# Patient Record
Sex: FEMALE | Race: WHITE
[De-identification: names, ages, dates, MRNs, and addresses within clinical notes are randomized per-mention and may not be internally consistent; named-entity substitution may affect disease eponyms.]

---

## 2018-02-01 ENCOUNTER — HOSPITAL ENCOUNTER (EMERGENCY)
Dept: HOSPITAL 33 - ED | Age: 53
Discharge: HOME | End: 2018-02-01
Payer: COMMERCIAL

## 2018-02-01 VITALS — SYSTOLIC BLOOD PRESSURE: 131 MMHG | DIASTOLIC BLOOD PRESSURE: 77 MMHG | HEART RATE: 88 BPM | OXYGEN SATURATION: 97 %

## 2018-02-01 DIAGNOSIS — G43.001: Primary | ICD-10-CM

## 2018-02-01 LAB
BASE EXCESS BLDV CALC-SCNC: 1.5 MMOL/L (ref -2–2)
COHGB MFR BLDV: 1.9 % T HGB (ref 0–6.9)
HCO3 BLDV-SCNC: 27.7 MEQ/L (ref 22–28)
HGB BLDV-MCNC: 13.4 G/DL
INHALED O2 CONCENTRATION: 21 %
PCO2 BLDV: 49 MM/HG (ref 42–55)
PO2 BLDV: 29 MM/HG (ref 25–40)
POTASSIUM BLDV-SCNC: 4 MMOL/L (ref 3.5–5.1)
SAO2 % BLDV: 63.9 % (ref 95–100)

## 2018-02-01 PROCEDURE — 82805 BLOOD GASES W/O2 SATURATION: CPT

## 2018-02-01 PROCEDURE — 96360 HYDRATION IV INFUSION INIT: CPT

## 2018-02-01 PROCEDURE — 99284 EMERGENCY DEPT VISIT MOD MDM: CPT

## 2018-02-01 PROCEDURE — 36000 PLACE NEEDLE IN VEIN: CPT

## 2018-02-01 PROCEDURE — 96375 TX/PRO/DX INJ NEW DRUG ADDON: CPT

## 2018-02-01 PROCEDURE — 96374 THER/PROPH/DIAG INJ IV PUSH: CPT

## 2018-02-01 NOTE — ERPHSYRPT
- History of Present Illness


Time Seen by Provider: 02/01/18 09:09


Source: patient


Patient Subjective Stated Complaint: PT STATES SHE HAS HAD A HEADACHE FOR THE 

PAST 1 MONTH. STATES HER PHARMACY HAS BEEN OUT OF THE IMITREX INJECTIONS SHE 

TAKES. STATES HEADACHE BECAME WORSE YESTERDAY.


Triage Nursing Assessment: PT PINK, WARM, DRY. PUPILS PERRL. PT AMBULATED INTO 

ER WITHOUT DIFFICULTY.


Physician History: 





CC: headache


Hx: 51 y/o patient of Dr Feliciano with long hx of migraine headaches. She has hx 

of headache since last month, gradually worse. Pharmacy out of her imitrex so 

she could not take it. No fever or chills. She has photophobia. No focal 

weakness.


Timing/Duration: day(s)


Quality: aching


Severity of Pain-Max: severe


Severity of Pain-Current: severe


Allergies/Adverse Reactions: 








Sulfa (Sulfonamide Antibiotics) [Sulfa(Sulfonamide Antibiotics)] Allergy (Mild, 

Verified 02/01/18 09:05)


 Hives





Home Medications: 








Clonazepam [Klonopin] 2 mg PO .UNKNOWN 07/09/12 [History]


Pravastatin Sodium 20 mg PO .UNKNOWN 11/26/15 [History]


Buprenorphine HCl [Belbuca] 600 mcg BC BID 02/01/18 [History]





Hx Tetanus, Diphtheria Vaccination/Date Given: Yes (UP TO DATE)


Hx Influenza Vaccination/Date Given: No


Hx Pneumococcal Vaccination/Date Given: No


Immunizations Up to Date: Yes





- Review of Systems


Constitutional: No Fever, No Chills


Eyes: Photophobia


Ears, Nose, & Throat: No Symptoms


Respiratory: No Cough, No Dyspnea


Cardiac: No Chest Pain


Abdominal/Gastrointestinal: Nausea, No Abdominal Pain, No Vomiting, No Diarrhea


Genitourinary Symptoms: No Dysuria


Skin: No Rash


Neurological: Headache, No Dizziness, No Focal Weakness, No Parasthesia


All Other Systems: Reviewed and Negative





- Past Medical History


Pertinent Past Medical History: Yes


Neurological History: No Pertinent History


ENT History: No Pertinent History


Cardiac History: Coronary Artery Disease, Myocardial Infarction (MI)


Respiratory History: Bronchitis, Sleep Apnea


Endocrine Medical History: No Pertinent History


Musculoskeletal History: Arthritis


GI Medical History: No Pertinent History


 History: No Pertinent History


Psycho-Social History: Anxiety, Depression


Female Reproductive Disorders: No Pertinent History


Other Medical History: HTN.  Migraine Headaches.  Coronary Stent





- Past Surgical History


Past Surgical History: Yes


Neuro Surgical History: No Pertinent History


Cardiac: Cardiac Catheterization, Cardiac Stent


Respiratory: No Pertinent History


Gastrointestinal: No Pertinent History


Genitourinary: No Pertinent History


Musculoskeletal: No Pertinent History


Female Surgical History: Tubal Ligation


Other Surgical History: STENT X1, tonsilectomy





- Social History


Smoking Status: Never smoker


Exposure to second hand smoke: No


Drug Use: none


Patient Lives Alone: No


Significant Family History: heart disease





- Female History


Hx Pregnant Now: No





- Nursing Vital Signs


Nursing Vital Signs: 


 Initial Vital Signs











Temperature  97.8 F   02/01/18 08:59


 


Pulse Rate  83   02/01/18 08:59


 


Respiratory Rate  20   02/01/18 08:59


 


Blood Pressure  161/89   02/01/18 08:59


 


O2 Sat by Pulse Oximetry  96   02/01/18 08:59








 Pain Scale











Pain Intensity                 10

















- Physical Exam


General Appearance: alert


Eye Exam: PERRL/EOMI, photophobia


Ears, Nose, Throat Exam: normal ENT inspection, moist mucous membranes


Neck Exam: normal inspection, non-tender, supple, No meningismus


Respiratory Exam: normal breath sounds


Cardiovascular Exam: regular rate/rhythm


Gastrointestinal/Abdominal Exam: soft, No tenderness, No distention


Extremity Exam: normal inspection, normal range of motion


Mental Status Exam: alert, oriented x 3, cooperative


CNs Exam: normal hearing, normal speech, PERRL


Motor/Sensory Exam: no motor deficit, no sensory deficit


Skin Exam: warm, dry, No rash


**SpO2 Interpretation**: normal


SpO2: 96


Oxygen Delivery: Room Air





- Course


Nursing assessment & vital signs reviewed: Yes


Ordered Tests: 


 Active Orders 24 hr











 Category Date Time Status


 


 IV Insertion STAT Care  02/01/18 09:16 Active


 


 VENOUS BLOOD GAS Urgent Lab  02/01/18 09:30 Completed








Medication Summary











Generic Name Dose Route Start Last Admin





  Trade Name Freq  PRN Reason Stop Dose Admin


 


Sodium Chloride  1,000 mls @ 999 mls/hr  02/01/18 09:16  02/01/18 09:30





  Sodium Chloride 0.9% 1000 Ml  IV  02/01/18 10:16  999 mls/hr





  .Q1H1M STA   Administration














Discontinued Medications














Generic Name Dose Route Start Last Admin





  Trade Name Freq  PRN Reason Stop Dose Admin


 


Diphenhydramine HCl  25 mg  02/01/18 09:16  02/01/18 09:30





  Benadryl 50 Mg/Ml***  IV  02/01/18 09:17  25 mg





  STAT ONE   Administration


 


Diphenhydramine HCl  Confirm  02/01/18 09:23  





  Benadryl 50 Mg/Ml***  Administered  02/01/18 09:24  





  Dose   





  50 mg   





  .ROUTE   





  .STK-MED ONE   


 


Sodium Chloride  Confirm  02/01/18 09:23  





  Sodium Chloride 0.9% 1000 Ml  Administered  02/01/18 09:24  





  Dose   





  1,000 mls @ ud   





  .ROUTE   





  .STK-MED ONE   


 


Ketorolac Tromethamine  30 mg  02/01/18 09:16  02/01/18 09:30





  Toradol 30 Mg Injection***  IV  02/01/18 09:17  30 mg





  STAT ONE   Administration


 


Ketorolac Tromethamine  Confirm  02/01/18 09:22  





  Toradol 30 Mg Injection***  Administered  02/01/18 09:23  





  Dose   





  30 mg   





  .ROUTE   





  .STK-MED ONE   


 


Metoclopramide HCl  10 mg  02/01/18 09:16  02/01/18 09:30





  Reglan 10 Mg/2 Ml***  IV  02/01/18 09:17  10 mg





  STAT ONE   Administration


 


Metoclopramide HCl  Confirm  02/01/18 09:23  





  Reglan 10 Mg/2 Ml***  Administered  02/01/18 09:24  





  Dose   





  10 mg   





  .ROUTE   





  .STK-MED ONE   











Lab/Rad Data: 


 Laboratory Results











  02/01/18 Range/Units





  09:30 


 


VBG pH  7.36  (7.32-7.42)  


 


VBG pCO2 at Pat Temp  49  (42-55)  mm/Hg


 


VBG pO2 at Pat Temp  29  (25-40)  mm/Hg


 


VBG HCO3  27.7  (22-28)  meq/L


 


VBG O2 Sat (Nae)  63.9 L  ()  


 


VBG Base Excess  1.5  (-2.0-2.0)  


 


VBG Hemoglobin  13.4  


 


VBG Carboxyhemoglobin  1.9  (0.0-6.9)  % T HGB


 


POC Potassium  4.0  (3.5-5.1)  














- Progress


Progress Note: 





02/01/18 10:08


CO normal. She is feeling better after benadryl/toradol/reglan and IVF. Will 

release with headache instructions.





Counseled pt/family regarding: lab results, diagnosis, need for follow-up





- Departure


Time of Disposition: 10:09


Departure Disposition: Home


Clinical Impression: 


Migraine headache


Qualifiers:


 Migraine type: without aura Status migrainosus presence: with status 

migrainosus Intractability: not intractable Qualified Code(s): G43.001 - 

Migraine without aura, not intractable, with status migrainosus





Condition: Stable


Critical Care Time: No


Referrals: 


NU FELICIANO [Primary Care Provider] - 


Instructions:  Headache, Adult (DC)


Additional Instructions: 


HEADACHE





1.  After discharge from the emergency department, you should rest at home in a 

cool, dark, quiet place for 12-24 hours.


2.  If any of the following signs or symptoms are noticed, you should be re-

evaluated right away:


   A.  Visual changes


   B.  Stiff Neck


   C.  Change in quality or location of pain


   D.  Fever


   E.  Recurrent vomiting


3.  If pain medications were prescribed or given, they may cause drowsiness.





No driving today and stay with family.


Follow up with Dr Feliciano regarding your normal medications.

## 2018-03-11 ENCOUNTER — HOSPITAL ENCOUNTER (EMERGENCY)
Dept: HOSPITAL 33 - ED | Age: 53
Discharge: ANOTHER HOSPITAL | End: 2018-03-11
Payer: COMMERCIAL

## 2018-03-11 VITALS — OXYGEN SATURATION: 98 %

## 2018-03-11 DIAGNOSIS — R42: Primary | ICD-10-CM

## 2018-03-11 DIAGNOSIS — R11.2: ICD-10-CM

## 2018-03-11 DIAGNOSIS — M19.90: ICD-10-CM

## 2018-03-11 DIAGNOSIS — I72.6: ICD-10-CM

## 2018-03-11 DIAGNOSIS — F41.8: ICD-10-CM

## 2018-03-11 LAB
ALBUMIN SERPL-MCNC: 4.6 G/DL (ref 3.5–5)
ALP SERPL-CCNC: 146 U/L (ref 38–126)
ALT SERPL-CCNC: 49 U/L (ref 0–35)
AMYLASE SERPL-CCNC: 78 U/L (ref 30–110)
ANION GAP SERPL CALC-SCNC: 18 MEQ/L (ref 5–15)
AST SERPL QL: 39 U/L (ref 14–36)
BASOPHILS # BLD AUTO: 0.03 10*3/UL (ref 0–0.4)
BASOPHILS NFR BLD AUTO: 0.3 % (ref 0–0.4)
BILIRUB BLD-MCNC: 0.4 MG/D? (ref 0.2–1.3)
BUN SERPL-MCNC: 14 MG/DL (ref 7–17)
CALCIUM SPEC-MCNC: 9.3 MG/DL (ref 8.4–10.2)
CHLORIDE SERPL-SCNC: 101 MEQ/L (ref 98–107)
CO2 SERPL-SCNC: 28 MMOL/L (ref 22–30)
CREAT SERPL-MCNC: 0.66 MG/DL (ref 0.52–1.04)
EOSINOPHIL # BLD AUTO: 0.04 10*3/UL (ref 0–0.5)
GLUCOSE SERPL-MCNC: 121 MG/DL (ref 74–106)
GLUCOSE UR-MCNC: NEGATIVE MG/DL
GRANULOCYTES # BLD AUTO: 9.65 10*3/UL (ref 1.4–6.9)
HCT VFR BLD AUTO: 39.5 % (ref 35–47)
HGB BLD-MCNC: 12.4 GM/DL (ref 12–16)
LIPASE SERPL-CCNC: 87 U/L (ref 23–300)
LYMPHOCYTES # SPEC AUTO: 1.55 10*3/UL (ref 1–4.6)
MCH RBC QN AUTO: 28.6 PG (ref 26–32)
MCHC RBC AUTO-ENTMCNC: 31.4 G/DL (ref 32–36)
MONOCYTES # BLD AUTO: 0.67 10*3/UL (ref 0–1.3)
NEUTROPHILS NFR BLD AUTO: 80.8 % (ref 36–66)
PLATELET # BLD AUTO: 387 K/MM3 (ref 150–450)
POTASSIUM SERPLBLD-SCNC: 4.1 MMOL/L (ref 3.5–5.1)
PROT SERPL-MCNC: 8.4 MG/DL (ref 6.3–8.2)
PROT UR STRIP-MCNC: (no result) MG/DL
RBC # BLD AUTO: 4.33 M/MM3 (ref 4.1–5.4)
SODIUM SERPL-SCNC: 143 MMOL/L (ref 137–145)
WBC # BLD AUTO: 11.9 K/MM3 (ref 4–10.5)
WBC URNS QL MICRO: (no result) /HPF (ref 0–5)

## 2018-03-11 PROCEDURE — 96361 HYDRATE IV INFUSION ADD-ON: CPT

## 2018-03-11 PROCEDURE — 36000 PLACE NEEDLE IN VEIN: CPT

## 2018-03-11 PROCEDURE — 84484 ASSAY OF TROPONIN QUANT: CPT

## 2018-03-11 PROCEDURE — 81000 URINALYSIS NONAUTO W/SCOPE: CPT

## 2018-03-11 PROCEDURE — 85025 COMPLETE CBC W/AUTO DIFF WBC: CPT

## 2018-03-11 PROCEDURE — 87086 URINE CULTURE/COLONY COUNT: CPT

## 2018-03-11 PROCEDURE — 36415 COLL VENOUS BLD VENIPUNCTURE: CPT

## 2018-03-11 PROCEDURE — 82150 ASSAY OF AMYLASE: CPT

## 2018-03-11 PROCEDURE — 96360 HYDRATION IV INFUSION INIT: CPT

## 2018-03-11 PROCEDURE — 99285 EMERGENCY DEPT VISIT HI MDM: CPT

## 2018-03-11 PROCEDURE — 70450 CT HEAD/BRAIN W/O DYE: CPT

## 2018-03-11 PROCEDURE — 83690 ASSAY OF LIPASE: CPT

## 2018-03-11 PROCEDURE — 96374 THER/PROPH/DIAG INJ IV PUSH: CPT

## 2018-03-11 PROCEDURE — 71045 X-RAY EXAM CHEST 1 VIEW: CPT

## 2018-03-11 PROCEDURE — 83735 ASSAY OF MAGNESIUM: CPT

## 2018-03-11 PROCEDURE — 80053 COMPREHEN METABOLIC PANEL: CPT

## 2018-03-11 PROCEDURE — 99284 EMERGENCY DEPT VISIT MOD MDM: CPT

## 2018-03-11 PROCEDURE — 93005 ELECTROCARDIOGRAM TRACING: CPT

## 2018-03-11 NOTE — ERPHSYRPT
- History of Present Illness


Time Seen by Provider: 03/11/18 21:00


Source: patient


Exam Limitations: no limitations


Patient Subjective Stated Complaint: dizzy and vomiting since 1700


Triage Nursing Assessment: alert and nauseated.  staes dizzy when opens eyes.  

started at 1700 today.  denies fever.. no urinary symptoms.


Physician History: 





FOR THE PAST 4 HOURS PT HAS HAD DIZZINESS(VERTIGO AND DISEQUILIBRIUM) AND 

VOMITING X5 WITHOUT BLOOD. PT DENIES CHEST PAIN, HEADACHE, SHORTNESS OF AIR, 

FEVER, WEAKNESS, NUMBNESS, ABDOMINAL PAIN.


Allergies/Adverse Reactions: 








Sulfa (Sulfonamide Antibiotics) [Sulfa(Sulfonamide Antibiotics)] Allergy (Mild, 

Verified 02/01/18 09:05)


 Hives





Home Medications: 








Clonazepam [Klonopin] 2 mg PO .UNKNOWN 07/09/12 [History]


Pravastatin Sodium 20 mg PO .UNKNOWN 11/26/15 [History]


Buprenorphine HCl [Belbuca] 600 mcg BC BID 02/01/18 [History]





Hx Tetanus, Diphtheria Vaccination/Date Given: Yes (UP TO DATE)


Hx Influenza Vaccination/Date Given: No


Hx Pneumococcal Vaccination/Date Given: No





- Review of Systems


Constitutional: No Fever


Respiratory: No Dyspnea


Cardiac: No Chest Pain


Abdominal/Gastrointestinal: Vomiting, No Abdominal Pain, No Diarrhea


Neurological: Dizziness, No Headache


All Other Systems: Reviewed and Negative





- Past Medical History


Pertinent Past Medical History: Yes


Neurological History: No Pertinent History


ENT History: No Pertinent History


Cardiac History: Coronary Artery Disease, Myocardial Infarction (MI)


Respiratory History: Bronchitis, Sleep Apnea


Endocrine Medical History: No Pertinent History


Musculoskeletal History: Arthritis


GI Medical History: No Pertinent History


 History: No Pertinent History


Psycho-Social History: Anxiety, Depression


Female Reproductive Disorders: No Pertinent History


Other Medical History: HTN.  Migraine Headaches.  Coronary Stent





- Past Surgical History


Past Surgical History: Yes


Neuro Surgical History: No Pertinent History


Cardiac: Cardiac Catheterization, Cardiac Stent


Respiratory: No Pertinent History


Gastrointestinal: No Pertinent History


Genitourinary: No Pertinent History


Musculoskeletal: No Pertinent History


Female Surgical History: Tubal Ligation


Other Surgical History: STENT X1, tonsilectomy





- Social History


Smoking Status: Never smoker


Exposure to second hand smoke: No


Drug Use: none


Patient Lives Alone: No


Significant Family History: heart disease





- Female History


Hx Pregnant Now: No





- Nursing Vital Signs


Nursing Vital Signs: 


 Initial Vital Signs











Temperature  97.8 F   03/11/18 21:02


 


Pulse Rate  80   03/11/18 21:02


 


Respiratory Rate  20   03/11/18 21:02


 


Blood Pressure  164/94   03/11/18 21:02


 


O2 Sat by Pulse Oximetry  98   03/11/18 21:02








 Pain Scale











Pain Intensity                 4

















- Physical Exam


General Appearance: alert


Eye Exam: PERRL/EOMI


Ears, Nose, Throat Exam: pharynx normal, moist mucous membranes


Neck Exam: normal inspection


Respiratory Exam: normal breath sounds


Cardiovascular Exam: normal heart sounds


Gastrointestinal/Abdomen Exam: soft, normal bowel sounds


Back Exam: normal range of motion


Extremity Exam: normal inspection, No pedal edema


Neurologic Exam: alert, cooperative, sensation nml, motor deficits (ROM OF LEFT 

UPPER AND BOTH LOWER EXTREMITIES FULL; RIGHT SHOULDER HAS LIMITED ABDUCTION(

ONGOING).)


Skin Exam: warm, dry


**SpO2 Interpretation**: normal


SpO2: 98


Oxygen Delivery: Room Air





- Course


Nursing assessment & vital signs reviewed: Yes





- CT Exams


  ** Head


CT Interpretation: Tele-radiologist Report (DILATED AND TORTUOUS LEFT VERTEBRAL 

ARTERY. MRI EVALUATION RECOMMENDED TO DETERMINE IF THIS IS AN ISOLATED VASCULAR 

DILATATION OR ASSOCIATED WITH A VASCULAR MASS.)


Ordered Tests: 


 Active Orders 24 hr











 Category Date Time Status


 


 Clean Catch Urine Specimen STAT Care  03/11/18 21:14 Active


 


 EKG-ER Only STAT Care  03/11/18 21:14 Active


 


 IV Insertion STAT Care  03/11/18 21:14 Active


 


 CHEST 1 VIEW (PORTABLE) Stat Exams  03/11/18 21:14 Taken


 


 HEAD WITHOUT CONTRAST [CT] Stat Exams  03/11/18 21:13 Taken


 


 AMYLASE Stat Lab  03/11/18 21:52 Completed


 


 CBC W DIFF Stat Lab  03/11/18 21:52 Completed


 


 CMP Stat Lab  03/11/18 21:52 Completed


 


 CULTURE,URINE Stat Lab  03/11/18 23:06 Received


 


 LIPASE Stat Lab  03/11/18 21:52 Completed


 


 MAGNESIUM Stat Lab  03/11/18 21:52 Completed


 


 TROPONIN Q3H Lab  03/11/18 21:52 Completed


 


 TROPONIN Q3H Lab  03/12/18 00:15 Ordered


 


 TROPONIN Q3H Lab  03/12/18 03:15 Ordered


 


 TROPONIN Q3H Lab  03/12/18 06:15 Ordered


 


 TROPONIN Q3H Lab  03/12/18 09:15 Ordered


 


 UA W/ MICROSCOPIC Stat Lab  03/11/18 23:06 Completed








Medication Summary











Generic Name Dose Route Start Last Admin





  Trade Name Freq  PRN Reason Stop Dose Admin


 


Sodium Chloride  1,000 mls @ 100 mls/hr  03/11/18 21:15  03/11/18 21:52





  Sodium Chloride 0.9% 1000 Ml  IV  04/10/18 21:14  100 mls/hr





  .Q10H KARLI   Administration














Discontinued Medications














Generic Name Dose Route Start Last Admin





  Trade Name Freq  PRN Reason Stop Dose Admin


 


Promethazine HCl  12.5 mg  03/11/18 21:14  03/11/18 21:52





  Phenergan 25 Mg Inj***  IV  03/11/18 21:15  12.5 mg





  STAT ONE   Administration


 


Promethazine HCl  Confirm  03/11/18 21:47  





  Phenergan 25 Mg Inj***  Administered  03/11/18 21:48  





  Dose   





  25 mg   





  .ROUTE   





  .[facility]-MED ONE   











Lab/Rad Data: 


 Laboratory Result Diagrams





 03/11/18 21:52 





 03/11/18 21:52 





 Laboratory Results











  03/11/18 03/11/18 03/11/18 Range/Units





  23:06 21:52 21:52 


 


WBC     (4.0-10.5)  K/mm3


 


RBC     (4.1-5.4)  M/mm3


 


Hgb     (12.0-16.0)  gm/dl


 


Hct     (35-47)  %


 


MCV     ()  fl


 


MCH     (26-32)  pg


 


MCHC     (32-36)  g/dl


 


RDW     (11.5-14.0)  %


 


Plt Count     (150-450)  K/mm3


 


MPV     (6-9.5)  fl


 


Gran %     (36.0-66.0)  %


 


Lymphocytes %     (24.0-44.0)  %


 


Monocytes %     (0.0-12.0)  %


 


Eosinophils %     (0.00-5.0)  %


 


Basophils %     (0.0-0.4)  %


 


Basophils #     (0-0.4)  


 


Sodium    143  (137-145)  mmol/L


 


Potassium    4.1  (3.5-5.1)  mmol/L


 


Chloride    101  ()  mEq/L


 


Carbon Dioxide    28  (22-30)  mmol/L


 


Anion Gap    18.0 H  (5-15)  MEQ/L


 


BUN    14  (7-17)  mg/dl


 


Creatinine    0.66  (0.52-1.04)  mg/dl


 


Estimated GFR    > 60  ML/MIN


 


Glucose    121 H  ()  mg/dL


 


Calcium    9.3  (8.4-10.2)  mg/dL


 


Magnesium    2.0  (1.6-2.3)  mg/dL


 


Total Bilirubin    0.40  (0.2-1.3)  mg/d?


 


AST    39 H  (14-36)  U/L


 


ALT    49 H  (0-35)  U/L


 


Alkaline Phosphatase    146 H  ()  U/L


 


Troponin I   < 0.012   (0.000-0.034)  ng/ml


 


Serum Total Protein    8.4 H  (6.3-8.2)  mg/dl


 


Albumin    4.6  (3.5-5.0)  g/dl


 


Amylase    78  ()  U/L


 


Lipase    87  ()  U/L


 


Ur Collection Type  VOID    


 


Urine Color  YELLOW    (YELLOW)  


 


Urine Appearance  CLEAR    (CLEAR)  


 


Urine pH  6.0    (5-6)  


 


Ur Specific Gravity  1.020    (1.005-1.025)  


 


Urine Protein  TRACE    (Negative)  


 


Urine Ketones  NEGATIVE    (NEGATIVE)  


 


Urine Blood  50    (0-5)  Darwin/ul


 


Urine Nitrite  NEGATIVE    (NEGATIVE)  


 


Urine Bilirubin  NEGATIVE    (NEGATIVE)  


 


Urine Urobilinogen  NORMAL    (0-1)  mg/dL


 


Ur Leukocyte Esterase  TRACE    (NEGATIVE)  


 


Urine Microscopic RBC  5-10    (0-2)  /HPF


 


Urine Microscopic WBC  0-2    (0-5)  /HPF


 


Ur Epithelial Cells  FEW    (FEW)  /HPF


 


Urine Bacteria  MODERATE    (NEGATIVE)  /HPF


 


Urine Mucus  MANY    (NEGATIVE)  /HPF


 


Urine Culture Reflexed  YES    (NO)  


 


Urine Glucose  NEGATIVE    (NEGATIVE)  mg/dL


 


Specimen Received  3/11/18 2155 03/11/18 Range/Units





  21:52 


 


WBC  11.9 H  (4.0-10.5)  K/mm3


 


RBC  4.33  (4.1-5.4)  M/mm3


 


Hgb  12.4  (12.0-16.0)  gm/dl


 


Hct  39.5  (35-47)  %


 


MCV  91.2  ()  fl


 


MCH  28.6  (26-32)  pg


 


MCHC  31.4 L  (32-36)  g/dl


 


RDW  13.4  (11.5-14.0)  %


 


Plt Count  387  (150-450)  K/mm3


 


MPV  9.9 H  (6-9.5)  fl


 


Gran %  80.8 H  (36.0-66.0)  %


 


Lymphocytes %  13.0 L  (24.0-44.0)  %


 


Monocytes %  5.6  (0.0-12.0)  %


 


Eosinophils %  0.3  (0.00-5.0)  %


 


Basophils %  0.3  (0.0-0.4)  %


 


Basophils #  0.03  (0-0.4)  


 


Sodium   (137-145)  mmol/L


 


Potassium   (3.5-5.1)  mmol/L


 


Chloride   ()  mEq/L


 


Carbon Dioxide   (22-30)  mmol/L


 


Anion Gap   (5-15)  MEQ/L


 


BUN   (7-17)  mg/dl


 


Creatinine   (0.52-1.04)  mg/dl


 


Estimated GFR   ML/MIN


 


Glucose   ()  mg/dL


 


Calcium   (8.4-10.2)  mg/dL


 


Magnesium   (1.6-2.3)  mg/dL


 


Total Bilirubin   (0.2-1.3)  mg/d?


 


AST   (14-36)  U/L


 


ALT   (0-35)  U/L


 


Alkaline Phosphatase   ()  U/L


 


Troponin I   (0.000-0.034)  ng/ml


 


Serum Total Protein   (6.3-8.2)  mg/dl


 


Albumin   (3.5-5.0)  g/dl


 


Amylase   ()  U/L


 


Lipase   ()  U/L


 


Ur Collection Type   


 


Urine Color   (YELLOW)  


 


Urine Appearance   (CLEAR)  


 


Urine pH   (5-6)  


 


Ur Specific Gravity   (1.005-1.025)  


 


Urine Protein   (Negative)  


 


Urine Ketones   (NEGATIVE)  


 


Urine Blood   (0-5)  Darwin/ul


 


Urine Nitrite   (NEGATIVE)  


 


Urine Bilirubin   (NEGATIVE)  


 


Urine Urobilinogen   (0-1)  mg/dL


 


Ur Leukocyte Esterase   (NEGATIVE)  


 


Urine Microscopic RBC   (0-2)  /HPF


 


Urine Microscopic WBC   (0-5)  /HPF


 


Ur Epithelial Cells   (FEW)  /HPF


 


Urine Bacteria   (NEGATIVE)  /HPF


 


Urine Mucus   (NEGATIVE)  /HPF


 


Urine Culture Reflexed   (NO)  


 


Urine Glucose   (NEGATIVE)  mg/dL


 


Specimen Received   














- Progress


Discussed with : Other (SPOKE WITH DR VAUGHAN([id number]) WHO ACCEPTED PT FOR 

TRANSFER TO [facility]. )





- Departure


Time of Disposition: 00:43


Departure Disposition: Transfer ([facility])


Clinical Impression: 


 DIZZINESS, VOMITING, DILATED AND TORTUOUS LEFT VERTEBRAL ARTERY, ARTHRITIS, 

ANXIETY, DEPRESSION





Condition: Stable


Critical Care Time: No


Referrals: 


DOCTOR,NO FAMILY [Primary Care Provider] -

## 2018-03-12 VITALS — SYSTOLIC BLOOD PRESSURE: 140 MMHG | HEART RATE: 77 BPM | DIASTOLIC BLOOD PRESSURE: 74 MMHG

## 2018-03-12 NOTE — XRAY
Indication: Dizziness.



Comparison: November 26, 2015.



Portable chest remains clear.  Heart and mediastinal structures within normal

limits.  Bony thorax intact.



Impression: Stable nonacute chest.

## 2018-03-12 NOTE — XRAY
Indication: Dizziness.  History of chronic migraines.



Multiple contiguous axial images obtained through the head without contrast.



Comparison: August 17, 2015.



Again normal appearing brain parenchyma, ventricles, and bony calvarium.

Stable mild dolichoectatic left vertebral artery.  Visualized paranasal

sinuses and mastoid air cells are clear.



Impression: No acute intracranial abnormalities.  Stable dolichoectatic left

vertebral artery.



Comment: Preliminary interpretation was made by [facility].  No critical discrepancy.



CT DI 67.22

## 2018-04-27 ENCOUNTER — HOSPITAL ENCOUNTER (OUTPATIENT)
Dept: HOSPITAL 33 - ED | Age: 53
Setting detail: OBSERVATION
LOS: 1 days | Discharge: HOME | End: 2018-04-28
Attending: FAMILY MEDICINE | Admitting: FAMILY MEDICINE
Payer: COMMERCIAL

## 2018-04-27 DIAGNOSIS — R07.9: Primary | ICD-10-CM

## 2018-04-27 DIAGNOSIS — I25.10: ICD-10-CM

## 2018-04-27 DIAGNOSIS — I25.2: ICD-10-CM

## 2018-04-27 DIAGNOSIS — I10: ICD-10-CM

## 2018-04-27 DIAGNOSIS — M19.90: ICD-10-CM

## 2018-04-27 DIAGNOSIS — F41.8: ICD-10-CM

## 2018-04-27 DIAGNOSIS — G47.30: ICD-10-CM

## 2018-04-27 LAB
ALBUMIN SERPL-MCNC: 4.6 G/DL (ref 3.5–5)
ALP SERPL-CCNC: 183 U/L (ref 38–126)
ALT SERPL-CCNC: 85 U/L (ref 0–35)
ANION GAP SERPL CALC-SCNC: 19.1 MEQ/L (ref 5–15)
APTT PPP: 33.8 SECONDS (ref 25.3–37)
AST SERPL QL: 61 U/L (ref 14–36)
BILIRUB BLD-MCNC: 0.4 MG/DL (ref 0.2–1.3)
BUN SERPL-MCNC: 9 MG/DL (ref 7–17)
CALCIUM SPEC-MCNC: 9.7 MG/DL (ref 8.4–10.2)
CHLORIDE SERPL-SCNC: 101 MMOL/L (ref 98–107)
CO2 SERPL-SCNC: 26 MMOL/L (ref 22–30)
CREAT SERPL-MCNC: 0.67 MG/DL (ref 0.52–1.04)
D DIMER BLD IA.RAPID-MCNC: 444.74 NG/ML (ref 215–500)
GLUCOSE SERPL-MCNC: 92 MG/DL (ref 74–106)
HCT VFR BLD AUTO: 39.8 % (ref 35–47)
HGB BLD-MCNC: 12.9 GM/DL (ref 12–16)
INR PPP: 1.02 (ref 0.8–3)
MCH RBC QN AUTO: 29.5 PG (ref 26–32)
MCHC RBC AUTO-ENTMCNC: 32.4 G/DL (ref 32–36)
PLATELET # BLD AUTO: 396 K/MM3 (ref 150–450)
POTASSIUM SERPLBLD-SCNC: 3.9 MMOL/L (ref 3.5–5.1)
PROT SERPL-MCNC: 8.8 G/DL (ref 6.3–8.2)
RBC # BLD AUTO: 4.38 M/MM3 (ref 4.1–5.4)
SODIUM SERPL-SCNC: 142 MMOL/L (ref 137–145)
WBC # BLD AUTO: 9.6 K/MM3 (ref 4–10.5)

## 2018-04-27 PROCEDURE — 84484 ASSAY OF TROPONIN QUANT: CPT

## 2018-04-27 PROCEDURE — 85730 THROMBOPLASTIN TIME PARTIAL: CPT

## 2018-04-27 PROCEDURE — 80053 COMPREHEN METABOLIC PANEL: CPT

## 2018-04-27 PROCEDURE — 36415 COLL VENOUS BLD VENIPUNCTURE: CPT

## 2018-04-27 PROCEDURE — 71045 X-RAY EXAM CHEST 1 VIEW: CPT

## 2018-04-27 PROCEDURE — 36000 PLACE NEEDLE IN VEIN: CPT

## 2018-04-27 PROCEDURE — 93268 ECG RECORD/REVIEW: CPT

## 2018-04-27 PROCEDURE — 99285 EMERGENCY DEPT VISIT HI MDM: CPT

## 2018-04-27 PROCEDURE — 93041 RHYTHM ECG TRACING: CPT

## 2018-04-27 PROCEDURE — 94760 N-INVAS EAR/PLS OXIMETRY 1: CPT

## 2018-04-27 PROCEDURE — 85025 COMPLETE CBC W/AUTO DIFF WBC: CPT

## 2018-04-27 PROCEDURE — 85379 FIBRIN DEGRADATION QUANT: CPT

## 2018-04-27 PROCEDURE — G0378 HOSPITAL OBSERVATION PER HR: HCPCS

## 2018-04-27 PROCEDURE — 85610 PROTHROMBIN TIME: CPT

## 2018-04-27 PROCEDURE — 85027 COMPLETE CBC AUTOMATED: CPT

## 2018-04-27 PROCEDURE — 93005 ELECTROCARDIOGRAM TRACING: CPT

## 2018-04-27 RX ADMIN — METOPROLOL TARTRATE SCH MG: 25 TABLET, FILM COATED ORAL at 23:02

## 2018-04-27 NOTE — ERPHSYRPT
- History of Present Illness


Time Seen by Provider: 04/27/18 19:10


Historian: patient


Exam Limitations: no limitations


Patient Subjective Stated Complaint: pt reports "squeezing" to the left upper 

chest. states it started 5 mins PTA. reports she was at an auction at the time 

of onset. pt states she is also short of breath which is chronic for her.


Triage Nursing Assessment: pt is aox3, pupils perrl, pt is short of breath at 

rest, lung sounds are clear throughout, heart sounds are strong and regular, 

radial pulses strong and equal. pain the to the left chest described as 

pressure that is non radiating. no edema appreciated. skin is pink warm dry. pt 

is afebrile.


Physician History: 





Squeezing pain left upper chest associated with shortness of breath began 5 

minutes prior to arrival while walking








Patient denies nausea





Past medical history includes bronchitis, sleep apnea, coronary artery disease, 

myocardial infarction, arthritis, anxiety, depression, high blood pressure, 

migraines, coronary stent.





Past surgical history includes cardiac catheter cardiac stent tubal ligation 

and tonsillectomy





Social history patient denies tobacco alcohol or illicit drug use





Timing/Duration: today (5 minutes prior to arrival)


Activities at Onset: other (walking)


Quality: other (squeezing)


Location: other (left upper chest)


Chest Pain Radiation: no radiation


Severity of Pain-Max: moderate


Severity of Pain-Current: mild


Modifying Factors: Improves With: nothing


Associated Symptoms: shortness of breath, No nausea, No vomiting, No 

palpitations, No heartburn, No abdominal pain, No cough, No hurts to breathe, 

No diaphoresis, No chills, No fever, No fatigue, No weakness, No swelling/lump 

in chest, No syncope, No rash, No headache, No dizziness, No edema, No back pain


Prior Chest Pain/Cardiac Workup: cardiac cath, heart attack


Nitro Today/Relief: no nitro taken today


Aspirin Treatment Today: 81 mg x 4, provided by ED


Allergies/Adverse Reactions: 








Sulfa (Sulfonamide Antibiotics) [Sulfa(Sulfonamide Antibiotics)] Allergy (Mild, 

Verified 04/27/18 18:47)


 Hives





Home Medications: 








clonazePAM [Klonopin] 2 mg PO .UNKNOWN 07/09/12 [History]


Pravastatin Sodium 20 mg PO .UNKNOWN 11/26/15 [History]


Buprenorphine HCl [Belbuca] 600 mcg BC BID 02/01/18 [History]





Hx Tetanus, Diphtheria Vaccination/Date Given: Yes


Hx Influenza Vaccination/Date Given: No


Hx Pneumococcal Vaccination/Date Given: No


Immunizations Up to Date: Yes





- Review of Systems


Constitutional: No Fever, No Chills


Eyes: No Symptoms


Ears, Nose, & Throat: No Symptoms


Respiratory: Dyspnea, No Cough


Cardiac: Chest Pain


Abdominal/Gastrointestinal: No Abdominal Pain, No Nausea, No Vomiting, No 

Diarrhea, No Constipation, No Hematemesis, No Hematochezia, No Melena, No 

Dysphagia, No Appetite Changes


Genitourinary Symptoms: No No Symptoms, No Dysuria


Musculoskeletal: No Back Pain, No Neck Pain


Skin: No Rash


Neurological: No Dizziness, No Focal Weakness, No Sensory Changes


Psychological: No Symptoms


Endocrine: No Symptoms


All Other Systems: Reviewed and Negative





- Past Medical History


Pertinent Past Medical History: Yes


Neurological History: No Pertinent History


ENT History: No Pertinent History


Cardiac History: Coronary Artery Disease, Myocardial Infarction (MI)


Respiratory History: Bronchitis, Sleep Apnea


Endocrine Medical History: No Pertinent History


Musculoskeletal History: Arthritis


GI Medical History: No Pertinent History


 History: No Pertinent History


Psycho-Social History: Anxiety, Depression


Female Reproductive Disorders: No Pertinent History


Other Medical History: HTN.  Migraine Headaches.  Coronary Stent





- Past Surgical History


Past Surgical History: Yes


Neuro Surgical History: No Pertinent History


Cardiac: Cardiac Catheterization, Cardiac Stent


Respiratory: No Pertinent History


Gastrointestinal: No Pertinent History


Genitourinary: No Pertinent History


Musculoskeletal: No Pertinent History


Female Surgical History: Tubal Ligation


Other Surgical History: STENT X1, tonsilectomy





- Social History


Smoking Status: Never smoker


Exposure to second hand smoke: No


Drug Use: none


Patient Lives Alone: No


Significant Family History: heart disease





- Female History


Hx Last Menstrual Period: menopause





- Nursing Vital Signs


Nursing Vital Signs: 


 Initial Vital Signs











Temperature  98.6 F   04/27/18 18:38


 


Pulse Rate  90   04/27/18 18:38


 


Respiratory Rate  20   04/27/18 18:38


 


Blood Pressure  181/101   04/27/18 18:38


 


O2 Sat by Pulse Oximetry  99   04/27/18 18:38








 Pain Scale











Pain Intensity                 10

















- Physical Exam


General Appearance: mild distress


Eye Exam: PERRL/EOMI, eyes nml inspection


Ears, Nose, Throat Exam: normal ENT inspection, moist mucous membranes


Neck Exam: normal inspection, non-tender, supple, full range of motion


Respiratory Exam: normal breath sounds, lungs clear, No respiratory distress


Cardiovascular Exam: regular rate/rhythm, normal heart sounds


Gastrointestinal/Abdomen Exam: soft, No tenderness, No mass


Back Exam: normal inspection, No CVA tenderness, No vertebral tenderness


Extremity Exam: normal inspection, normal range of motion


Neurologic Exam: alert, oriented x 3, cooperative, CNs II-XII nml as tested, 

normal mood/affect, sensation nml, No motor deficits


Skin Exam: normal color, warm, dry


**SpO2 Interpretation**: normal (99%)


SpO2: 99


Oxygen Delivery: Room Air





- Course


Nursing assessment & vital signs reviewed: Yes


EKG Interpreted by Me: RATE (follow-up him90 bpm), Sinus Rhythm, NORMAL 

INTERVALS, Other (EKG sinus rhythm, 90 beats per minute, normal axis, T-wave 

inversion V1 through V3 old, no acute ST or T wave changes no change from 

November 27, 2015)





- Radiology Exams


  ** Chest


X-ray Interpretation: Interpreted by me ( lacunar history), No Pneumonia, No 

Pneumothorax, Other (no acute disease process noted)


Ordered Tests: 


 Active Orders 24 hr











 Category Date Time Status


 


 Cardiac Monitor STAT Care  04/27/18 18:51 Active


 


 EKG-ER Only STAT Care  04/27/18 18:50 Active


 


 IV Insertion STAT Care  04/27/18 18:50 Active


 


 Oxygen-ED Only NASAL CANNULA 2 lpm Care  04/27/18 18:50 Active


 


 CHEST 1 VIEW (PORTABLE) Stat Exams  04/27/18 18:51 Taken


 


 CBC Stat Lab  04/27/18 18:55 Completed


 


 CMP Stat Lab  04/27/18 18:55 Completed


 


 D-DIMER QUANTITATION Stat Lab  04/27/18 18:50 Completed


 


 PROTIME WITH INR Stat Lab  04/27/18 18:50 Completed


 


 PTT Stat Lab  04/27/18 18:50 Completed


 


 TROPONIN Q3H Lab  04/27/18 18:50 Completed


 


 TROPONIN Q3H Lab  04/27/18 22:00 Ordered








Medication Summary














Discontinued Medications














Generic Name Dose Route Start Last Admin





  Trade Name Freq  PRN Reason Stop Dose Admin


 


Aspirin  324 mg  04/27/18 19:05  04/27/18 19:09





  Baby Aspirin 81 Mg Chew***  PO  04/27/18 19:06  324 mg





  STAT ONE   Administration


 


Aspirin  Confirm  04/27/18 19:11  





  Baby Aspirin 81 Mg Chew***  Administered  04/27/18 19:12  





  Dose   





  324 mg   





  .ROUTE   





  .STK-MED ONE   











Lab/Rad Data: 


 Laboratory Result Diagrams





 04/27/18 18:55 





 04/27/18 18:55 





 Laboratory Results











  04/27/18 04/27/18 04/27/18 Range/Units





  18:55 18:55 18:50 


 


WBC   9.6   (4.0-10.5)  K/mm3


 


RBC   4.38   (4.1-5.4)  M/mm3


 


Hgb   12.9   (12.0-16.0)  gm/dl


 


Hct   39.8   (35-47)  %


 


MCV   90.9   ()  fl


 


MCH   29.5   (26-32)  pg


 


MCHC   32.4   (32-36)  g/dl


 


RDW   13.8   (11.5-14.0)  %


 


Plt Count   396   (150-450)  K/mm3


 


MPV   9.9 H   (6-9.5)  fl


 


PT    11.4  (9.95-12.35)  SECONDS


 


INR    1.02  (0.8-3.0)  


 


APTT    33.8  (25.3-37.0)  SECONDS


 


D-Dimer    444.74  (215-500)  ng/mL


 


Sodium  142    (137-145)  mmol/L


 


Potassium  3.9    (3.5-5.1)  mmol/L


 


Chloride  101    ()  mmol/L


 


Carbon Dioxide  26    (22-30)  mmol/L


 


Anion Gap  19.1 H    (5-15)  MEQ/L


 


BUN  9    (7-17)  mg/dL


 


Creatinine  0.67    (0.52-1.04)  mg/dL


 


Estimated GFR  > 60.0    ML/MIN


 


Glucose  92    ()  mg/dL


 


Calcium  9.7    (8.4-10.2)  mg/dL


 


Total Bilirubin  0.40    (0.2-1.3)  mg/dL


 


AST  61 H    (14-36)  U/L


 


ALT  85 H    (0-35)  U/L


 


Alkaline Phosphatase  183 H    ()  U/L


 


Troponin I     (0.000-0.034)  ng/mL


 


Serum Total Protein  8.8 H    (6.3-8.2)  g/dL


 


Albumin  4.6    (3.5-5.0)  g/dL














  04/27/18 Range/Units





  18:50 


 


WBC   (4.0-10.5)  K/mm3


 


RBC   (4.1-5.4)  M/mm3


 


Hgb   (12.0-16.0)  gm/dl


 


Hct   (35-47)  %


 


MCV   ()  fl


 


MCH   (26-32)  pg


 


MCHC   (32-36)  g/dl


 


RDW   (11.5-14.0)  %


 


Plt Count   (150-450)  K/mm3


 


MPV   (6-9.5)  fl


 


PT   (9.95-12.35)  SECONDS


 


INR   (0.8-3.0)  


 


APTT   (25.3-37.0)  SECONDS


 


D-Dimer   (215-500)  ng/mL


 


Sodium   (137-145)  mmol/L


 


Potassium   (3.5-5.1)  mmol/L


 


Chloride   ()  mmol/L


 


Carbon Dioxide   (22-30)  mmol/L


 


Anion Gap   (5-15)  MEQ/L


 


BUN   (7-17)  mg/dL


 


Creatinine   (0.52-1.04)  mg/dL


 


Estimated GFR   ML/MIN


 


Glucose   ()  mg/dL


 


Calcium   (8.4-10.2)  mg/dL


 


Total Bilirubin   (0.2-1.3)  mg/dL


 


AST   (14-36)  U/L


 


ALT   (0-35)  U/L


 


Alkaline Phosphatase   ()  U/L


 


Troponin I  < 0.012  (0.000-0.034)  ng/mL


 


Serum Total Protein   (6.3-8.2)  g/dL


 


Albumin   (3.5-5.0)  g/dL














- Progress


Progress: improved


Air Movement: fair


Progress Note: 





04/27/18 19:24


52-year-old white female arrives with complaints of squeezing pain left upper 

anterior chest began while she was walking 5 minutes prior to arrival 

associated with shortness of breath no nausea no vomiting.





Patient does have a history of myocardial infarction and coronary artery stent 

anxiety depression high blood pressure bronchitis.


Patient is feeling better states she has had some intermittent pain.


Patient's EKG shows normal sinus rhythm 90 bpm normal axis she has T wave 

inversion V1 through V3 which is chronic there are no acute changes in her EKG 

as compared to November 27, 2015


Patient is given aspirin 324 mg orally.


Awaiting labs patient states her pain is going away.





04/27/18 20:17


Patient feeling better.


EKG chest x-ray labs essentially normal.


Will discuss with Dr. Pandya who is covering for Dr. Feliciano-ago


04/27/18 20:18





 Patient is discussed with Dr. Joselito Pandya covering for Dr. Feliciano will place 

patient on [facility] telemetry obtain serial cardiac enzymes.


Continue to provide aspirin orally.











- Departure


Time of Disposition: 20:23


Departure Disposition: Observation


Clinical Impression: 


Chest pain


Qualifiers:


 Chest pain type: unspecified Qualified Code(s): R07.9 - Chest pain, unspecified





Condition: Fair


Critical Care Time: No


Referrals: 


NU FELICIANO [Primary Care Provider] -

## 2018-04-28 VITALS — HEART RATE: 84 BPM

## 2018-04-28 VITALS — SYSTOLIC BLOOD PRESSURE: 120 MMHG | DIASTOLIC BLOOD PRESSURE: 73 MMHG

## 2018-04-28 VITALS — OXYGEN SATURATION: 98 %

## 2018-04-28 LAB
ALBUMIN SERPL-MCNC: 3.7 G/DL (ref 3.5–5)
ALP SERPL-CCNC: 144 U/L (ref 38–126)
ALT SERPL-CCNC: 67 U/L (ref 0–35)
ANION GAP SERPL CALC-SCNC: 12.1 MEQ/L (ref 5–15)
AST SERPL QL: 40 U/L (ref 14–36)
BASOPHILS # BLD AUTO: 0.04 10*3/UL (ref 0–0.4)
BASOPHILS NFR BLD AUTO: 0.5 % (ref 0–0.4)
BILIRUB BLD-MCNC: 0.4 MG/DL (ref 0.2–1.3)
BUN SERPL-MCNC: 8 MG/DL (ref 7–17)
CALCIUM SPEC-MCNC: 8.8 MG/DL (ref 8.4–10.2)
CHLORIDE SERPL-SCNC: 104 MMOL/L (ref 98–107)
CO2 SERPL-SCNC: 29 MMOL/L (ref 22–30)
CREAT SERPL-MCNC: 0.71 MG/DL (ref 0.52–1.04)
EOSINOPHIL # BLD AUTO: 0.38 10*3/UL (ref 0–0.5)
GLUCOSE SERPL-MCNC: 89 MG/DL (ref 74–106)
GRANULOCYTES # BLD AUTO: 4.3 10*3/UL (ref 1.4–6.9)
HCT VFR BLD AUTO: 36.5 % (ref 35–47)
HGB BLD-MCNC: 11.5 GM/DL (ref 12–16)
LYMPHOCYTES # SPEC AUTO: 2.6 10*3/UL (ref 1–4.6)
MCH RBC QN AUTO: 29.1 PG (ref 26–32)
MCHC RBC AUTO-ENTMCNC: 31.5 G/DL (ref 32–36)
MONOCYTES # BLD AUTO: 0.82 10*3/UL (ref 0–1.3)
NEUTROPHILS NFR BLD AUTO: 52.8 % (ref 36–66)
PLATELET # BLD AUTO: 347 K/MM3 (ref 150–450)
POTASSIUM SERPLBLD-SCNC: 4 MMOL/L (ref 3.5–5.1)
PROT SERPL-MCNC: 7.2 G/DL (ref 6.3–8.2)
RBC # BLD AUTO: 3.95 M/MM3 (ref 4.1–5.4)
SODIUM SERPL-SCNC: 142 MMOL/L (ref 137–145)
WBC # BLD AUTO: 8.1 K/MM3 (ref 4–10.5)

## 2018-04-28 RX ADMIN — METOPROLOL TARTRATE SCH MG: 25 TABLET, FILM COATED ORAL at 09:43

## 2018-04-28 NOTE — PCM.DCORD
- Discharge


Discharge Date: 04/28/18


Disposition: Home, Self-Care


Condition: Stable


Prescriptions: 


New


   Aspirin EC 81 mg*** [Ecotrin 81 mg***] 162 mg PO DAILY #60 tablet





Continue


   Buprenorphine HCl [Belbuca] 600 mcg BC BID


   Ondansetron HCl 4 mg PO Q6H PRN PRN


     PRN Reason: Nausea


   Amitriptyline HCl 100 mg PO HS


   Metoprolol Tartrate 25 mg*** [Lopressor 25MG Tab***] 25 mg PO BID


   Clonazepam 1 tab PO BID PRN PRN


     PRN Reason: Anxiety





Discontinued


   Sumatriptan Succinate 6 mg*** [Imitrex 6 MG/0.5 ML***] 6 mg SQ UD PRN


     PRN Reason: Headache


Follow up with: 


ZULEMA HAMILTON [ACTIVE STAFF] - 1 Week

## 2018-04-28 NOTE — XRAY
Indication: Chest pain.



Comparison: March 11, 2018.



Portable chest again demonstrates normal heart, lungs, and bony thorax.

## 2018-04-28 NOTE — PCM.HP
History of Present Illness





- Chief Complaint


Chief Complaint: Chest Pain


Date: 04/28/18


History of Present Illness: 


 is a 52 year old female.


who follows with Dr. Mittal of cardiology and had a stent placed in 2010 and a 

second cath sometime in the last 2 years with no stent placed, who presented 

with a grabbing feeling in her chest. She states she forgot to take her 

medications yesterday and often does forget to take her medications and was 

walking around a parking lot yesterday afternoon at a [place] when she 

suddnely felt a grabbing sensation in her left side of her chest that caused 

her to need to take a break and lean against a car. She was having some 

shortness of breath prior to this and she states she often has shortness of 

breath with any exertion. She was feeling the pressure continue and thus went 

to ED. She believes the pain lasted about 30 minutes. She now feels something 

but no pain in the chest now. No tenderness with palpation. No cough, no nausea 

vomiting or diarrhea. No recent illness. She does not take any aspirin or blood 

thinners at home


she reports she has an appointment scheduled with cardiology next week. 





- Review of Systems


Constitutional: No Fever, No Chills


Eyes: No Symptoms


Ears, Nose, & Throat: No Symptoms


Respiratory: No Cough, No Short Of Breath


Cardiac: Chest Pain, No Edema, No Syncope


Abdominal/Gastrointestinal: No Abdominal Pain, No Nausea, No Vomiting, No 

Diarrhea


Genitourinary Symptoms: No Dysuria


Musculoskeletal: No Back Pain, No Neck Pain


Skin: No Rash


Neurological: No Dizziness, No Focal Weakness, No Sensory Changes


Psychological: No Symptoms


Endocrine: No Symptoms


Hematologic/Lymphatic: No Symptoms


Immunological/Allergic: No Symptoms





Medications & Allergies


Home Medications: 


 Home Medication List





Buprenorphine HCl [Belbuca] 600 mcg BC BID 02/01/18 [History Confirmed 04/27/18]


Amitriptyline HCl 100 mg PO HS 04/27/18 [History Confirmed 04/27/18]


Clonazepam [Clonazepam] 1 tab PO BID PRN PRN 04/27/18 [History Confirmed 04/27/ 18]


Metoprolol Tartrate 25 mg*** [Lopressor 25MG Tab***] 25 mg PO BID 04/27/18 [

History Confirmed 04/27/18]


Ondansetron HCl 4 mg PO Q6H PRN PRN 04/27/18 [History Confirmed 04/27/18]


Sumatriptan Succinate 6 mg*** [Imitrex 6 MG/0.5 ML***] 6 mg SQ UD PRN 04/27/18 [

History Confirmed 04/27/18]








Allergies/Adverse Reactions: 


 Allergies











Allergy/AdvReac Type Severity Reaction Status Date / Time


 


Sulfa (Sulfonamide Allergy Mild Hives Verified 04/27/18 18:47





Antibiotics)     





[Sulfa(Sulfonamide     





Antibiotics)]     














- Past Medical History


Past Medical History: Yes


Neurological History: No Pertinent History


ENT History: No Pertinent History


Cardiac History: Aneurysm, Coronary Artery Disease, Myocardial Infarction (MI)


Respiratory History: Bronchitis, Sleep Apnea


Endocrine Medical History: No Pertinent History


Musculoskelatal History: Arthritis


GI Medical History: No Pertinent History


 History: No Pertinent History


Pyscho-Social History: Anxiety, Depression


Reproductive Disorders: No Pertinent History


Comment: HTN.  Migraine Headaches.  Coronary Stent.  Brain Aneurysm





- Female History


Hx Last Menstrual Period: menopause


Are you pregnant now?: No





- Past Surgical History


Past Surgical History: Yes


Neuro Surgical History: No Pertinent History


Cardiac History: Cardiac Catheterization, Cardiac Stent


Respiratory Surgery: No Pertinent History


GI Surgical History: No Pertinent History


Genitourinary Surgical Hx: No Pertinent History


Musculskeletal Surgical Hx: No Pertinent History


Female Surgical History: Tubal Ligation


Other Surgical History: STENT X1, tonsilectomy





- Social History


Smoking Status: Never smoker


Exposure to second hand smoke: No


Alcohol: None


Drug Use: none


Significant Family History: heart disease





- Physical Exam


Vital Signs: 


 Vital Signs - 24 hr











  Temp Pulse Pulse Resp BP BP Pulse Ox


 


 04/28/18 08:06        98


 


 04/28/18 07:22  97.8 F  72   20  120/73   97


 


 04/28/18 04:03  97.8 F  74   18  134/70   95


 


 04/27/18 23:50        97


 


 04/27/18 23:33  98.3 F  79   16  140/72   98


 


 04/27/18 22:02  98.8 F  83   16  169/79   96


 


 04/27/18 22:00        98


 


 04/27/18 20:25  98.6 F  90     146/96  99


 


 04/27/18 20:24        99


 


 04/27/18 18:38  98.6 F  90  90  20   181/101  99








 Oxygen-Last 24 hours











O2 Percentage                  2 Liters = 28%


 


O2 Percentage                  2 Liters = 28%


 


O2 Percentage                  2 Liters = 28%


 


O2 Percentage                  2 Liters = 28%


 


Oxygen Flowrate (L/min)-RT     2














General Appearance: no apparent distress, alert, obese


Neurologic Exam: alert, oriented x 3, cooperative, normal mood/affect, nml 

cerebellar function, nml station & gait, sensation nml, No motor deficits


Eye Exam: PERRL/EOMI, eyes nml inspection


Ears, Nose, Throat Exam: normal ENT inspection, pharynx normal, moist mucous 

membranes


Neck Exam: normal inspection, non-tender, supple, full range of motion


Respiratory Exam: normal breath sounds, lungs clear, No respiratory distress


Cardiovascular Exam: regular rate/rhythm, normal heart sounds, normal 

peripheral pulses


Gastrointestinal/Abdomen Exam: soft, normal bowel sounds, No tenderness, No mass


Back Exam: normal inspection, normal range of motion, No CVA tenderness, No 

vertebral tenderness


Extremity Exam: normal inspection, normal range of motion, pelvis stable


Skin Exam: normal color, warm, dry, No rash


Lymphatic Exam: No adenopathy





Results





- Labs


Lab/Micro Results: 


 Lab Results-Last 24 Hours











  04/27/18 04/28/18 04/28/18 Range/Units





  22:17 05:00 05:12 


 


WBC    8.1  (4.0-10.5)  K/mm3


 


RBC    3.95 L  (4.1-5.4)  M/mm3


 


Hgb    11.5 L  (12.0-16.0)  gm/dl


 


Hct    36.5  (35-47)  %


 


MCV    92.4  ()  fl


 


MCH    29.1  (26-32)  pg


 


MCHC    31.5 L  (32-36)  g/dl


 


RDW    13.8  (11.5-14.0)  %


 


Plt Count    347  (150-450)  K/mm3


 


MPV    9.4  (6-9.5)  fl


 


Gran %    52.8  (36.0-66.0)  %


 


Eos # (Auto)    0.38  (0-0.5)  


 


Absolute Lymphs (auto)    2.60  (1.0-4.6)  


 


Absolute Monos (auto)    0.82  (0.0-1.3)  


 


Lymphocytes %    31.9  (24.0-44.0)  %


 


Monocytes %    10.1  (0.0-12.0)  %


 


Eosinophils %    4.7  (0.00-5.0)  %


 


Basophils %    0.5  (0.0-0.4)  %


 


Absolute Granulocytes    4.30  (1.4-6.9)  


 


Basophils #    0.04  (0-0.4)  


 


Sodium     (137-145)  mmol/L


 


Potassium     (3.5-5.1)  mmol/L


 


Chloride     ()  mmol/L


 


Carbon Dioxide     (22-30)  mmol/L


 


Anion Gap     (5-15)  MEQ/L


 


BUN     (7-17)  mg/dL


 


Creatinine     (0.52-1.04)  mg/dL


 


Estimated GFR     ML/MIN


 


Glucose     ()  mg/dL


 


Calcium     (8.4-10.2)  mg/dL


 


Total Bilirubin     (0.2-1.3)  mg/dL


 


AST     (14-36)  U/L


 


ALT     (0-35)  U/L


 


Alkaline Phosphatase     ()  U/L


 


Troponin I  < 0.012  < 0.012   (0.000-0.034)  ng/mL


 


Serum Total Protein     (6.3-8.2)  g/dL


 


Albumin     (3.5-5.0)  g/dL














  04/28/18 Range/Units





  05:12 


 


WBC   (4.0-10.5)  K/mm3


 


RBC   (4.1-5.4)  M/mm3


 


Hgb   (12.0-16.0)  gm/dl


 


Hct   (35-47)  %


 


MCV   ()  fl


 


MCH   (26-32)  pg


 


MCHC   (32-36)  g/dl


 


RDW   (11.5-14.0)  %


 


Plt Count   (150-450)  K/mm3


 


MPV   (6-9.5)  fl


 


Gran %   (36.0-66.0)  %


 


Eos # (Auto)   (0-0.5)  


 


Absolute Lymphs (auto)   (1.0-4.6)  


 


Absolute Monos (auto)   (0.0-1.3)  


 


Lymphocytes %   (24.0-44.0)  %


 


Monocytes %   (0.0-12.0)  %


 


Eosinophils %   (0.00-5.0)  %


 


Basophils %   (0.0-0.4)  %


 


Absolute Granulocytes   (1.4-6.9)  


 


Basophils #   (0-0.4)  


 


Sodium  142  (137-145)  mmol/L


 


Potassium  4.0  (3.5-5.1)  mmol/L


 


Chloride  104  ()  mmol/L


 


Carbon Dioxide  29  (22-30)  mmol/L


 


Anion Gap  12.1  (5-15)  MEQ/L


 


BUN  8  (7-17)  mg/dL


 


Creatinine  0.71  (0.52-1.04)  mg/dL


 


Estimated GFR  > 60.0  ML/MIN


 


Glucose  89  ()  mg/dL


 


Calcium  8.8  (8.4-10.2)  mg/dL


 


Total Bilirubin  0.40  (0.2-1.3)  mg/dL


 


AST  40 H  (14-36)  U/L


 


ALT  67 H  (0-35)  U/L


 


Alkaline Phosphatase  144 H  ()  U/L


 


Troponin I   (0.000-0.034)  ng/mL


 


Serum Total Protein  7.2  (6.3-8.2)  g/dL


 


Albumin  3.7  (3.5-5.0)  g/dL














- Other Procedures and Tests


 Respiratory Therapy





04/28/18 08:07


Oxygen NASAL CANNULA 2 lpm 














Assessment/Plan


(1) Chest pain


Current Visit: Yes   Status: Acute   


Qualifiers: 


   Chest pain type: unspecified   Qualified Code(s): R07.9 - Chest pain, 

unspecified   


Assessment & Plan: 


with her history of coronary artery disease per the patient 


advised her to start taking aspirin 162 mg every day until discussing with her 

cardiologist and to take her metoprolol as prescribed by her cardiologist


she reports she has scheduled f/u with them next week


she had no events overnight and troponins remain negative and serial ekg is 

unchanged from previous ekg several years ago


discussed chest pain precautions. 


d/c home today keep f/u with Dr. Mittal


Code(s): R07.9 - CHEST PAIN, UNSPECIFIED   





(2) Coronary arteriosclerosis


Current Visit: Yes   Status: Chronic

## 2018-08-03 ENCOUNTER — HOSPITAL ENCOUNTER (EMERGENCY)
Dept: HOSPITAL 33 - ED | Age: 53
Discharge: HOME | End: 2018-08-03
Payer: COMMERCIAL

## 2018-08-03 VITALS — HEART RATE: 85 BPM | DIASTOLIC BLOOD PRESSURE: 72 MMHG | SYSTOLIC BLOOD PRESSURE: 137 MMHG

## 2018-08-03 VITALS — OXYGEN SATURATION: 96 %

## 2018-08-03 DIAGNOSIS — G47.30: ICD-10-CM

## 2018-08-03 DIAGNOSIS — M19.90: ICD-10-CM

## 2018-08-03 DIAGNOSIS — I25.2: ICD-10-CM

## 2018-08-03 DIAGNOSIS — Z79.899: ICD-10-CM

## 2018-08-03 DIAGNOSIS — I10: ICD-10-CM

## 2018-08-03 DIAGNOSIS — F41.8: ICD-10-CM

## 2018-08-03 DIAGNOSIS — I25.10: ICD-10-CM

## 2018-08-03 DIAGNOSIS — G43.909: Primary | ICD-10-CM

## 2018-08-03 PROCEDURE — 36000 PLACE NEEDLE IN VEIN: CPT

## 2018-08-03 PROCEDURE — 96375 TX/PRO/DX INJ NEW DRUG ADDON: CPT

## 2018-08-03 PROCEDURE — 96374 THER/PROPH/DIAG INJ IV PUSH: CPT

## 2018-08-03 PROCEDURE — 99284 EMERGENCY DEPT VISIT MOD MDM: CPT

## 2018-08-03 PROCEDURE — 96360 HYDRATION IV INFUSION INIT: CPT

## 2018-08-03 PROCEDURE — 96365 THER/PROPH/DIAG IV INF INIT: CPT

## 2018-08-03 NOTE — ERPHSYRPT
- History of Present Illness


Time Seen by Provider: 08/03/18 13:15


Source: patient


Exam Limitations: no limitations


Patient Subjective Stated Complaint: pt reports migraine for 4 days. states she 

has not slept in 4 nights. also reports tick bite recently.


Triage Nursing Assessment: pt is aox3, pupils perrl, pt afebrile, hand  

strong and equal bilat, pt speech is clear and appropriate. radial pulses 

strong and equal. pain described as pressure behind the eyes. pt has a small, 

pinpoint scab noted to the back of the left arm. skin is intact no redness noted

, no drainage present.


Physician History: 





52-year-old white female arrives with complaint of a frontal area headache 

symptoms for 4 days positive nausea positive photophobia.


She states this feels like her migraine she's had before.





Past medical history includes brain aneurysm, coronary artery disease, 

myocardial infarction, bronchitis, sleep apnea, anxiety, arthritis, depression, 

high blood pressure, migraines, coronary artery stents, stenting of a brain 

aneurysm








Past surgical history includes cardiac catheter cardiac stent tubal ligation 

stent in a brain aneurysm tonsillectomy and adenoidectomy





Social history patient denies tobacco alcohol or illicit drug use


Timing/Duration: day(s) (4 days)


Severity: moderate


Modifying Factors: Improves With: nothing


Associated Symptoms: nausea, headaches, No vomiting, No abdominal pain, No 

shortness of breath, No heartburn, No diaphoresis, No cough, No chills, No 

chest pain, No fever, No loss of appetite, No malaise, No rash, No syncope, No 

seizure, No weakness


Allergies/Adverse Reactions: 








Sulfa (Sulfonamide Antibiotics) [Sulfa(Sulfonamide Antibiotics)] Allergy (Mild, 

Verified 08/03/18 13:14)


 Hives





Home Medications: 








Buprenorphine HCl [Belbuca] 600 mcg BC BID 02/01/18 [History]


Amitriptyline HCl 100 mg PO HS 04/27/18 [History]


Clonazepam 1 tab PO BID PRN PRN 04/27/18 [History]


Metoprolol Tartrate 25 mg*** [Lopressor 25MG Tab***] 25 mg PO BID 04/27/18 [

History]


Ondansetron HCl 4 mg PO Q6H PRN PRN 04/27/18 [History]





Hx Tetanus, Diphtheria Vaccination/Date Given: Yes


Hx Influenza Vaccination/Date Given: No


Hx Pneumococcal Vaccination/Date Given: No


Immunizations Up to Date: Yes





- Review of Systems


Constitutional: No Fever, No Chills


Eyes: Photophobia, No Eye Pain, No Eye Redness, No Tearing, No Vision Changes


Ears, Nose, & Throat: No Symptoms


Respiratory: No Cough, No Dyspnea


Cardiac: No Chest Pain, No Edema, No Syncope


Abdominal/Gastrointestinal: Nausea, No Abdominal Pain, No Vomiting, No Diarrhea


Genitourinary Symptoms: No Dysuria


Musculoskeletal: No Back Pain, No Neck Pain


Skin: No Rash


Neurological: Headache, No Dizziness, No Focal Weakness, No Gait Changes, No 

Irritability, No Lethargy, No Paralysis, No Parasthesia, No Seizure, No Sensory 

Changes, No Speech Changes, No Tics, No Tremors, No Vertigo


Psychological: No Symptoms


Endocrine: No Symptoms


All Other Systems: Reviewed and Negative





- Past Medical History


Pertinent Past Medical History: Yes


Neurological History: No Pertinent History


ENT History: No Pertinent History


Cardiac History: Aneurysm, Coronary Artery Disease, Myocardial Infarction (MI)


Respiratory History: Bronchitis, Sleep Apnea


Endocrine Medical History: No Pertinent History


Musculoskeletal History: Arthritis


GI Medical History: No Pertinent History


 History: No Pertinent History


Psycho-Social History: Anxiety, Depression


Female Reproductive Disorders: No Pertinent History


Other Medical History: HTN.  Migraine Headaches.  Coronary Stent.  Brain 

Aneurysm





- Past Surgical History


Past Surgical History: Yes


Neuro Surgical History: No Pertinent History


Cardiac: Cardiac Catheterization, Cardiac Stent


Respiratory: No Pertinent History


Gastrointestinal: No Pertinent History


Genitourinary: No Pertinent History


Musculoskeletal: No Pertinent History


Female Surgical History: Tubal Ligation


Other Surgical History: STENT X1, tonsilectomy





- Social History


Smoking Status: Never smoker


Exposure to second hand smoke: No


Drug Use: none


Patient Lives Alone: No


Significant Family History: heart disease





- Female History


Hx Pregnant Now: No





- Nursing Vital Signs


Nursing Vital Signs: 


 Initial Vital Signs











Temperature  98.0 F   08/03/18 13:01


 


Pulse Rate  90   08/03/18 13:01


 


Respiratory Rate  20   08/03/18 13:01


 


Blood Pressure  161/85   08/03/18 13:01


 


O2 Sat by Pulse Oximetry  96   08/03/18 13:01








 Pain Scale











Pain Intensity                 5

















- Physical Exam


General Appearance: mild distress


Eye Exam: PERRL/EOMI, other (fundi unremarkable)


Ears, Nose, Throat Exam: normal ENT inspection, TMs normal, pharynx normal, 

moist mucous membranes


Neck Exam: normal inspection, non-tender, supple, full range of motion


Respiratory Exam: normal breath sounds, lungs clear, No respiratory distress


Cardiovascular Exam: regular rate/rhythm, normal heart sounds, normal 

peripheral pulses


Gastrointestinal/Abdomen Exam: soft, normal bowel sounds, No tenderness, No mass


Back Exam: normal inspection, normal range of motion, No CVA tenderness, No 

vertebral tenderness


Extremity Exam: normal inspection, normal range of motion, pelvis stable


Neurologic Exam: alert, oriented x 3, cooperative, CNs II-XII nml as tested, 

normal mood/affect, nml cerebellar function, nml station & gait, sensation nml, 

No motor deficits


Skin Exam: normal color, warm, dry, No rash


**SpO2 Interpretation**: normal (96%)


SpO2: 96


Oxygen Delivery: Room Air


Ordered Tests: 


 Active Orders 24 hr











 Category Date Time Status


 


 IV Insertion STAT Care  08/03/18 13:14 Active








Medication Summary














Discontinued Medications














Generic Name Dose Route Start Last Admin





  Trade Name Freq  PRN Reason Stop Dose Admin


 


Diphenhydramine HCl  25 mg  08/03/18 13:14  08/03/18 13:38





  Benadryl 50 Mg/Ml***  IV  08/03/18 13:15  25 mg





  STAT ONE   Administration





     





     





     





     


 


Diphenhydramine HCl  Confirm  08/03/18 13:26  





  Benadryl 50 Mg/Ml***  Administered  08/03/18 13:27  





  Dose   





  50 mg   





  .ROUTE   





  .STK-MED ONE   





     





     





     





     


 


Sodium Chloride  1,000 mls @ 999 mls/hr  08/03/18 13:14  08/03/18 14:52





  Sodium Chloride 0.9% 1000 Ml  IV  08/03/18 14:14  Infused





  .Q1H1M STA   Infusion





     





     





     





     


 


Sodium Chloride  Confirm  08/03/18 13:26  





  Sodium Chloride 0.9% 1000 Ml  Administered  08/03/18 13:27  





  Dose   





  1,000 mls @ ud   





  .ROUTE   





  .STK-MED ONE   





     





     





     





     


 


Metoclopramide HCl  10 mg  08/03/18 13:14  08/03/18 13:38





  Reglan 10 Mg/2 Ml***  IV  08/03/18 13:15  10 mg





  STAT ONE   Administration





     





     





     





     


 


Metoclopramide HCl  Confirm  08/03/18 13:26  





  Reglan 10 Mg/2 Ml***  Administered  08/03/18 13:27  





  Dose   





  10 mg   





  .ROUTE   





  .STK-MED ONE   





     





     





     





     














- Progress


Progress: improved


Progress Note: 





08/03/18 16:11


This is a 52-year-old white female with history of migraines complaining of a 

headache for 4 days.


She is given 1 L of normal saline Reglan 10 mg IV and Benadryl 25 mg IV.


She is feeling much better feels like she wants to go home


Will discharge patient.








- Departure


Time of Disposition: 16:12


Departure Disposition: Home


Clinical Impression: 


Migraine headache


Qualifiers:


 Migraine type: unspecified Status migrainosus presence: without status 

migrainosus Intractability: not intractable Qualified Code(s): G43.909 - 

Migraine, unspecified, not intractable, without status migrainosus





Condition: Fair


Critical Care Time: No


Referrals: 


NU FELICIANO [Primary Care Provider] - 


Instructions:  Headache, Adult (DC)


Additional Instructions: 





return home, rest in a dark quiet room


follow up with Dr Feliciano or Dr Dawn


return for acute distress or for severe symptoms.

## 2018-08-05 ENCOUNTER — HOSPITAL ENCOUNTER (EMERGENCY)
Dept: HOSPITAL 33 - ED | Age: 53
Discharge: HOME | End: 2018-08-05
Payer: COMMERCIAL

## 2018-08-05 VITALS — DIASTOLIC BLOOD PRESSURE: 83 MMHG | HEART RATE: 82 BPM | SYSTOLIC BLOOD PRESSURE: 146 MMHG | OXYGEN SATURATION: 98 %

## 2018-08-05 DIAGNOSIS — G47.30: ICD-10-CM

## 2018-08-05 DIAGNOSIS — Z79.899: ICD-10-CM

## 2018-08-05 DIAGNOSIS — I25.10: ICD-10-CM

## 2018-08-05 DIAGNOSIS — M19.90: ICD-10-CM
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
## 2018-08-05 DIAGNOSIS — I10: ICD-10-CM

## 2018-08-05 DIAGNOSIS — S89.91XA: Primary | ICD-10-CM

## 2018-08-05 DIAGNOSIS — F41.8: ICD-10-CM

## 2018-08-05 DIAGNOSIS — I25.2: ICD-10-CM

## 2018-08-05 PROCEDURE — 73610 X-RAY EXAM OF ANKLE: CPT

## 2018-08-05 PROCEDURE — 73630 X-RAY EXAM OF FOOT: CPT

## 2018-08-05 PROCEDURE — 96372 THER/PROPH/DIAG INJ SC/IM: CPT

## 2018-08-05 PROCEDURE — 99284 EMERGENCY DEPT VISIT MOD MDM: CPT

## 2018-08-05 PROCEDURE — 73562 X-RAY EXAM OF KNEE 3: CPT

## 2018-08-05 RX ADMIN — KETOROLAC TROMETHAMINE ONE MG: 30 INJECTION, SOLUTION INTRAMUSCULAR; INTRAVENOUS at 15:35

## 2018-08-05 NOTE — XRAY
Indication: Pain following fall.



Comparison: February 25, 2015.



3 views of the right knee again demonstrates tiny suprapatellar spurring.  No

new/acute bony, articular, or soft tissue abnormalities.

## 2018-08-05 NOTE — XRAY
Indication: Pain following fall.



Comparison: None



3 views of the right ankle demonstrates small heel spurs.  No other bony,

articular, or soft tissue abnormalities.

## 2018-08-05 NOTE — ERPHSYRPT
- History of Present Illness


Time Seen by Provider: 08/05/18 14:53


Source: patient


Exam Limitations: no limitations


Patient Subjective Stated Complaint: pt states she fell from the 4th step of a 

ladder early this morning injuring her right knee and toes. pt reports severe 

pain. pt denies striking her head, pt denies LOC, pt denies any other injury.


Triage Nursing Assessment: pt is aox3, pupils perrl, resps easy and non labored

, pt skin pink warm dry. bruising noted to the right foot, pedal pulses are 

strong and equal bilateral. pt is able to move lower extremities including 

toes. pt has pain with movement and weight bearing. skin is intact. pt 

sensation is intact, ROM is decreased due to pain.


Physician History: 





pt states she fell from the 4th step of a ladder early this morning injuring 

her right knee and toes. pt reports severe pain.


Method of Injury: fell


Occurred: this morning


Quality: constant


Severity of Pain-Max: moderate


Severity of Pain-Current: moderate


Lower Extremities Pain: knee: right, foot: right, ankle: right


Allergies/Adverse Reactions: 








Sulfa (Sulfonamide Antibiotics) [Sulfa(Sulfonamide Antibiotics)] Allergy (Mild, 

Verified 08/05/18 14:34)


 Hives





Home Medications: 








Buprenorphine HCl [Belbuca] 600 mcg BC BID 02/01/18 [History]


Amitriptyline HCl 100 mg PO HS 04/27/18 [History]


Clonazepam 1 tab PO BID PRN PRN 04/27/18 [History]


Metoprolol Tartrate 25 mg*** [Lopressor 25MG Tab***] 25 mg PO BID 04/27/18 [

History]


Ondansetron HCl 4 mg PO Q6H PRN PRN 04/27/18 [History]





Hx Tetanus, Diphtheria Vaccination/Date Given: Yes


Hx Influenza Vaccination/Date Given: Yes


Hx Pneumococcal Vaccination/Date Given: Yes





- Review of Systems


Constitutional: No Symptoms


Eyes: No Symptoms


Ears, Nose, & Throat: No Symptoms


Respiratory: No Symptoms


Cardiac: No Symptoms


Abdominal/Gastrointestinal: No Symptoms


Genitourinary Symptoms: No Symptoms


Musculoskeletal: Fall, Joint Pain, No Deformity





- Past Medical History


Pertinent Past Medical History: Yes


Neurological History: No Pertinent History


ENT History: No Pertinent History


Cardiac History: Aneurysm, Coronary Artery Disease, Myocardial Infarction (MI)


Respiratory History: Bronchitis, Sleep Apnea


Endocrine Medical History: No Pertinent History


Musculoskeletal History: Arthritis


GI Medical History: No Pertinent History


 History: No Pertinent History


Psycho-Social History: Anxiety, Depression


Female Reproductive Disorders: No Pertinent History


Other Medical History: HTN.  Migraine Headaches.  Coronary Stent.  Brain 

Aneurysm





- Past Surgical History


Past Surgical History: Yes


Neuro Surgical History: No Pertinent History


Cardiac: Cardiac Catheterization, Cardiac Stent


Respiratory: No Pertinent History


Gastrointestinal: No Pertinent History


Genitourinary: No Pertinent History


Musculoskeletal: No Pertinent History


Female Surgical History: Tubal Ligation


Other Surgical History: STENT X1, tonsilectomy





- Social History


Smoking Status: Never smoker


Exposure to second hand smoke: No


Drug Use: none


Patient Lives Alone: No


Significant Family History: heart disease





- Female History


Hx Pregnant Now: No





- Nursing Vital Signs


Nursing Vital Signs: 


 Initial Vital Signs











Temperature  97.9 F   08/05/18 14:25


 


Pulse Rate  95 H  08/05/18 14:25


 


Respiratory Rate  18   08/05/18 14:25


 


Blood Pressure  123/93   08/05/18 14:25


 


O2 Sat by Pulse Oximetry  94 L  08/05/18 14:25








 Pain Scale











Pain Intensity                 8

















- Physical Exam


General Appearance: no apparent distress


Eyes, Ears, Nose, Throat Exam: normal ENT inspection


Neck Exam: normal inspection


Back Exam: normal inspection


Hips Exam: bilateral: non-tender


Legs Exam: bilateral leg: non-tender


Knees Exam: right knee: pain, soft tissue tenderness


Ankle Exam: right ankle: pain, soft tissue tenderness


Foot Exam: right foot: pain, soft tissue tenderness


SpO2: 94


Oxygen Delivery: Room Air





- Radiology Exams


  ** Knee


X-ray Interpretation: Reviewed by me, Negative, No Fracture





  ** Ankle


X-ray Interpretation: Reviewed by me, Negative, No Fracture





  ** Foot


X-ray Interpretation: Reviewed by me, Negative, No Fracture


Ordered Tests: 


 Active Orders 24 hr











 Category Date Time Status


 


 ANKLE (3 VIEWS) Stat Exams  08/05/18 14:45 Taken


 


 FOOT (MINIMUM 3 VIEWS) Stat Exams  08/05/18 14:45 Taken


 


 KNEE (3 VIEWS) Stat Exams  08/05/18 14:45 Taken














- Progress


Progress: improved, pain not gone completely


Counseled pt/family regarding: diagnosis, need for follow-up, rad results





- Departure


Time of Disposition: 15:27


Departure Disposition: Home


Clinical Impression: 


 Fall (on) (from) other stairs and steps, initial encounter





Injury, knee, leg, ankle, and foot


Qualifiers:


 Encounter type: initial encounter Laterality: right Qualified Code(s): 

S89.91XA - Unspecified injury of right lower leg, initial encounter; S99.911A - 

Unspecified injury of right ankle, initial encounter; S99.921A - Unspecified 

injury of right foot, initial encounter





Condition: Stable


Critical Care Time: No


Referrals: 


NU SANTAMARIA [Primary Care Provider] - 


Instructions:  Contusion (DC), Preventing Falls, Knee Sprain (DC), Passive 

Range of Motion Exercises, Legs, Hips, Knees, and Ankles, Ankle Sprain


Prescriptions: 


Naproxen 375 mg*** [Naprosyn 375 mg***] 375 mg PO Q8H #30 tablet

## 2018-08-05 NOTE — XRAY
Indication: Pain following fall.



Comparison: None



3 nonweightbearing views of the right foot demonstrates small heel spurs.  No

other bony, articular, or soft tissue abnormalities.

## 2019-12-05 ENCOUNTER — HOSPITAL ENCOUNTER (EMERGENCY)
Dept: HOSPITAL 33 - ED | Age: 54
Discharge: HOME | End: 2019-12-05
Payer: COMMERCIAL

## 2019-12-05 VITALS — SYSTOLIC BLOOD PRESSURE: 136 MMHG | HEART RATE: 73 BPM | DIASTOLIC BLOOD PRESSURE: 88 MMHG | OXYGEN SATURATION: 98 %

## 2019-12-05 DIAGNOSIS — R20.0: Primary | ICD-10-CM

## 2019-12-05 DIAGNOSIS — G43.909: ICD-10-CM

## 2019-12-05 LAB
ANION GAP SERPL CALC-SCNC: 15.6 MEQ/L (ref 5–15)
BUN SERPL-MCNC: 14 MG/DL (ref 7–17)
CALCIUM SPEC-MCNC: 9.6 MG/DL (ref 8.4–10.2)
CHLORIDE SERPL-SCNC: 111 MMOL/L (ref 98–107)
CO2 SERPL-SCNC: 23 MMOL/L (ref 22–30)
CREAT SERPL-MCNC: 0.81 MG/DL (ref 0.52–1.04)
GLUCOSE SERPL-MCNC: 80 MG/DL (ref 74–106)
POTASSIUM SERPLBLD-SCNC: 4 MMOL/L (ref 3.5–5.1)
SODIUM SERPL-SCNC: 145 MMOL/L (ref 137–145)

## 2019-12-05 PROCEDURE — 96372 THER/PROPH/DIAG INJ SC/IM: CPT

## 2019-12-05 PROCEDURE — 84436 ASSAY OF TOTAL THYROXINE: CPT

## 2019-12-05 PROCEDURE — 84480 ASSAY TRIIODOTHYRONINE (T3): CPT

## 2019-12-05 PROCEDURE — 84443 ASSAY THYROID STIM HORMONE: CPT

## 2019-12-05 PROCEDURE — 36415 COLL VENOUS BLD VENIPUNCTURE: CPT

## 2019-12-05 PROCEDURE — 82962 GLUCOSE BLOOD TEST: CPT

## 2019-12-05 PROCEDURE — 99284 EMERGENCY DEPT VISIT MOD MDM: CPT

## 2019-12-05 PROCEDURE — 70450 CT HEAD/BRAIN W/O DYE: CPT

## 2019-12-05 PROCEDURE — 80048 BASIC METABOLIC PNL TOTAL CA: CPT

## 2019-12-05 NOTE — ERPHSYRPT
- History of Present Illness


Time Seen by Provider: 12/05/19 08:09


Source: patient


Exam Limitations: no limitations


Patient Subjective Stated Complaint: Pt states that she gave herself insulin 

yesterday for the first time and then her arms began going numb from the elbows 

down


Triage Nursing Assessment: Pt walked into the ER with a stable gait, right eye 

sluggish to light, left upper extemity mild weakness compared to right, no 

difficulties with lower strength, no deficits with stroke scale, when touching 

both of her arms and asking if it feels the same she stated that it does, 

pulses normal, denies pain except she states that she feels like she is going 

to get a migraine, doesn't appear to be in any distress


Physician History: 





55 y/o diabetic, obese white female presents with bilat arm numbness that began 

this morning. pt began injectable insulin yesterday. in addition, pt is 

hypothyroid and was started on thyroid supplementation one week ago. she does 

not have a migraine headache but feels one maybe coming on. 


Timing/Duration: today


Severity of Pain-Max: none


Severity of Pain-Current: none


Recent Head Trauma: no recent headache/trauma, chronic headaches


Modifying Factors: Improves With: medication (insulin and thyroid medication)


Associated Symptoms: other (numbness in bilat upper ext)


Previous symptoms: no prior history


Allergies/Adverse Reactions: 








Sulfa (Sulfonamide Antibiotics) [Sulfa(Sulfonamide Antibiotics)] Allergy (Mild, 

Verified 12/05/19 08:06)


 Hives





Home Medications: 








Buprenorphine HCl [Belbuca] 450 mcg BC BID 02/01/18 [History]


Amitriptyline HCl 200 mg PO HS 04/27/18 [History]


Clonazepam 1 tab PO BID PRN PRN 04/27/18 [History]


Metoprolol Tartrate 25 mg*** [Lopressor 25MG Tab***] 25 mg PO BID 04/27/18 [

History]


Ondansetron HCl 4 mg PO Q6H PRN PRN 04/27/18 [History]


Dapagliflozin Propanediol [Farxiga] 5 mg PO DAILY 12/05/19 [History]


Dulaglutide [Trulicity] 0.75 mg SQ WEEKLY 12/05/19 [History]


Hydrocodone/APAP 5-325 Tab^^^ [Norco 5-325 Tablet^^^] 1 each PO BID MDD 6 12/05/ 19 [History]


Levothyroxine Sodium 25 Mcg*** [Synthroid 25 Mcg***] 25 mcg PO DAILY 12/05/19 [

History]





Hx Tetanus, Diphtheria Vaccination/Date Given: Yes


Hx Influenza Vaccination/Date Given: Yes


Hx Pneumococcal Vaccination/Date Given: Yes





- Review of Systems


Constitutional: No Symptoms


Eyes: No Symptoms


Ears, Nose, & Throat: No Symptoms


Respiratory: No Symptoms


Cardiac: No Symptoms


Abdominal/Gastrointestinal: No Symptoms


Genitourinary Symptoms: No Symptoms


Musculoskeletal: No Symptoms


Skin: No Symptoms


Neurological: Parasthesia (bilat upper ext)


Psychological: Anxiety


Endocrine: No Symptoms


Hematologic/Lymphatic: No Symptoms


Immunological/Allergic: No Symptoms


All Other Systems: Reviewed and Negative





- Past Medical History


Pertinent Past Medical History: Yes


Neurological History: No Pertinent History


ENT History: No Pertinent History


Cardiac History: Aneurysm, Coronary Artery Disease, Myocardial Infarction (MI)


Respiratory History: Bronchitis, Sleep Apnea


Endocrine Medical History: No Pertinent History, Diabetes Type II


Musculoskeletal History: Arthritis


GI Medical History: No Pertinent History


 History: No Pertinent History


Psycho-Social History: Anxiety, Depression


Female Reproductive Disorders: No Pertinent History


Other Medical History: HTN.  Migraine Headaches.  Coronary Stent.  Brain 

Aneurysm





- Past Surgical History


Past Surgical History: Yes


Neuro Surgical History: No Pertinent History


Cardiac: Cardiac Catheterization, Cardiac Stent


Respiratory: No Pertinent History


Gastrointestinal: No Pertinent History


Genitourinary: No Pertinent History


Musculoskeletal: No Pertinent History


Female Surgical History: Tubal Ligation


Other Surgical History: STENT X1, tonsilectomy





- Social History


Smoking Status: Never smoker


Exposure to second hand smoke: No


Drug Use: none


Patient Lives Alone: No


Significant Family History: heart disease





- Female History


Hx Pregnant Now: No





- Nursing Vital Signs


Nursing Vital Signs: 


 Initial Vital Signs











Temperature  97.9 F   12/05/19 07:53


 


Pulse Rate  67   12/05/19 07:53


 


Blood Pressure  140/55   12/05/19 07:53


 


O2 Sat by Pulse Oximetry  99   12/05/19 07:53








 Pain Scale











Pain Intensity                 0

















- Physical Exam


General Appearance: no apparent distress, alert, anxiety, obese


Eye Exam: PERRL/EOMI, eyes nml inspection


Ears, Nose, Throat Exam: normal ENT inspection, moist mucous membranes


Neck Exam: normal inspection, non-tender, supple, full range of motion


Respiratory Exam: normal breath sounds, lungs clear, airway intact, No chest 

tenderness, No respiratory distress


Cardiovascular Exam: regular rate/rhythm, normal heart sounds, normal 

peripheral pulses


Gastrointestinal/Abdominal Exam: soft, normal bowel sounds, tenderness


Back Exam: normal inspection, normal range of motion, No CVA tenderness, No 

vertebral tenderness


Extremity Exam: normal inspection, normal range of motion, pelvis stable


Mental Status Exam: alert, oriented x 3, cooperative


CNs Exam: normal hearing, normal speech, PERRL, tongue midline


Coordination/Gait Exam: normal finger to nose, normal gait, normal cerebellar 

function


Motor/Sensory Exam: no motor deficit


Skin Exam: normal color, warm, dry


Lymphatic Exam: No adenopathy


**SpO2 Interpretation**: normal, borderline oxygenation


SpO2: 99


O2 Delivery: Room Air





- Course


Nursing assessment & vital signs reviewed: Yes


Ordered Tests: 


 Active Orders 24 hr











 Category Date Time Status


 


 HEAD WITHOUT CONTRAST [CT] Stat Exams  12/05/19 08:10 Completed


 


 BMP Stat Lab  12/05/19 09:00 Completed


 


 T4 (Thyroxine) Stat Lab  12/05/19 09:00 Completed


 


 TSH [TSH, 3RD Generation] Stat Lab  12/05/19 09:00 Completed











Lab/Rad Data: 


 Laboratory Result Diagrams





 12/05/19 09:00 





 Laboratory Results











  12/05/19 12/05/19 12/05/19 Range/Units





  09:00 09:00 09:00 


 


Sodium    145  (137-145)  mmol/L


 


Potassium    4.0  (3.5-5.1)  mmol/L


 


Chloride    111 H  ()  mmol/L


 


Carbon Dioxide    23  (22-30)  mmol/L


 


Anion Gap    15.6 H  (5-15)  MEQ/L


 


BUN    14  (7-17)  mg/dL


 


Creatinine    0.81  (0.52-1.04)  mg/dL


 


Estimated GFR    > 60.0  ML/MIN


 


Glucose    80  ()  mg/dL


 


Calcium    9.6  (8.4-10.2)  mg/dL


 


Thyroxine (T4)  7.35    (5.53-10.96)  ug/dL


 


TSH 3rd Generation   0.882   (0.47-4.68)  mIU/L














- Progress


Progress: unchanged


Air Movement: good


Progress Note: 





12/05/19 09:00


ct head-no acute intracranial process


12/05/19 10:51


pt now states she is having a migraine. 


Counseled pt/family regarding: lab results, diagnosis, need for follow-up, rad 

results





- Departure


Departure Disposition: Home


Clinical Impression: 


 Numbness, Migraine





Condition: Stable


Critical Care Time: No


Referrals: 


NU SANTAMARIA [Primary Care Provider] - 


Additional Instructions: 


take your hydrocodone medications as prescribed when you get home. follow up 

with your prescribing physician for further management

## 2019-12-05 NOTE — XRAY
Indication: Headache.



Multiple contiguous axial images obtained through the head without contrast.



Comparison: March 11, 2018.



Ventriculosulcal pattern appears symmetric.  No acute intracranial hemorrhage,

abnormal extra-axial fluid collection, or mass effect.  Fourth ventricle is

midline.  Gray-white matter differentiation is preserved.  There is now heavy

calcifications of the visualized vertebral basilar arteries.  Bony calvarium.

Visualized paranasal sinuses and mastoid air cells are clear.



Impression: No acute intracranial abnormalities.  New calcifications of the

vertebral basilar arteries.



CT DI 55.47

## 2020-12-20 ENCOUNTER — HOSPITAL ENCOUNTER (EMERGENCY)
Dept: HOSPITAL 33 - ED | Age: 55
Discharge: HOME | End: 2020-12-20
Payer: COMMERCIAL

## 2020-12-20 VITALS — DIASTOLIC BLOOD PRESSURE: 70 MMHG | SYSTOLIC BLOOD PRESSURE: 156 MMHG | HEART RATE: 88 BPM | OXYGEN SATURATION: 95 %

## 2020-12-20 DIAGNOSIS — F51.9: ICD-10-CM

## 2020-12-20 DIAGNOSIS — E11.9: ICD-10-CM

## 2020-12-20 DIAGNOSIS — I25.2: ICD-10-CM

## 2020-12-20 DIAGNOSIS — I25.10: ICD-10-CM

## 2020-12-20 DIAGNOSIS — M84.375A: Primary | ICD-10-CM

## 2020-12-20 DIAGNOSIS — Z79.899: ICD-10-CM

## 2020-12-20 DIAGNOSIS — M79.89: ICD-10-CM

## 2020-12-20 DIAGNOSIS — G47.30: ICD-10-CM
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
## 2020-12-20 PROCEDURE — 73630 X-RAY EXAM OF FOOT: CPT

## 2020-12-20 PROCEDURE — 99283 EMERGENCY DEPT VISIT LOW MDM: CPT

## 2020-12-20 NOTE — XRAY
Indication: Pain following injury.



Comparison: August 5, 2018.



3 nonweightbearing views right foot again demonstrates small heel spurs and

tiny navicular accessory ossicle.  ER clinician questions 4th metatarsal

fracture which I do not appreciate.  No other bony, articular, or soft tissue

abnormalities.

## 2020-12-20 NOTE — ERPHSYRPT
- History of Present Illness


Time Seen by Provider: 12/20/20 14:32


Source: patient


Exam Limitations: no limitations


Patient Subjective Stated Complaint: Pt dropped a playstation on her right foot


Triage Nursing Assessment: Pt was brought to the ER by her sister, top of right 

foot is swollen and bruising, hypertensive, pulses normal, cap refill normal, 

denies any other injuries


Physician History: 





Pt dropped a play station on her right foot





Method of Injury: direct blow


Occurred: just prior to arrival


Quality: constant


Severity of Pain-Max: moderate


Severity of Pain-Current: moderate


Lower Extremities Pain: ankle: right


Modifying Factors: Improves With: cold therapy


Associated Symptoms: none


Allergies/Adverse Reactions: 








Sulfa (Sulfonamide Antibiotics) [Sulfa(Sulfonamide Antibiotics)] Allergy (Mild, 

Verified 12/20/20 14:05)


   Hives





Home Medications: 








Buprenorphine HCl [Belbuca] 450 mcg BC BID 02/01/18 [History]


Amitriptyline HCl 200 mg PO HS 04/27/18 [History]


Clonazepam 1 tab PO BID PRN PRN 04/27/18 [History]


Metoprolol Tartrate 25 mg*** [Lopressor 25MG Tab***] 25 mg PO BID 04/27/18 

[History]


ondansetron HCL [Ondansetron HCl] 4 mg PO Q6H PRN PRN 04/27/18 [History]


Dapagliflozin Propanediol [Farxiga] 5 mg PO DAILY 12/05/19 [History]


Dulaglutide [Trulicity] 0.75 mg SQ WEEKLY 12/05/19 [History]


Hydrocodone/APAP 5-325 Tab^^^ [Norco 5-325 Tablet^^^] 1 each PO BID MDD 6 

12/05/19 [History]


Levothyroxine Sodium 25 Mcg*** [Synthroid 25 Mcg***] 25 mcg PO DAILY 12/05/19 

[History]





Hx Tetanus, Diphtheria Vaccination/Date Given: Yes


Hx Influenza Vaccination/Date Given: Yes


Hx Pneumococcal Vaccination/Date Given: Yes





Travel Risk





- International Travel


Have you traveled outside of the country in past 3 weeks: No





- Coronavirus Screening


Are you exhibiting any of the following symptoms?: No


Close contact with a COVID-19 positive Pt in past 14-21 Days: No





- Review of Systems


Constitutional: No Symptoms


Eyes: No Symptoms


Ears, Nose, & Throat: No Symptoms


Respiratory: No Symptoms


Cardiac: No Symptoms


Abdominal/Gastrointestinal: No Symptoms


Genitourinary Symptoms: No Symptoms


Musculoskeletal: Joint Pain, Joint Swelling (right foot)





- Past Medical History


Pertinent Past Medical History: Yes


Neurological History: No Pertinent History


ENT History: No Pertinent History


Cardiac History: Aneurysm, Coronary Artery Disease, Myocardial Infarction (MI)


Respiratory History: Bronchitis, Sleep Apnea


Endocrine Medical History: No Pertinent History, Diabetes Type II


Musculoskeletal History: Arthritis


GI Medical History: No Pertinent History


 History: No Pertinent History


Psycho-Social History: Anxiety, Depression


Female Reproductive Disorders: No Pertinent History


Other Medical History: HTN.  Migraine Headaches.  Coronary Stent.  Brain 

Aneurysm





- Past Surgical History


Past Surgical History: Yes


Neuro Surgical History: No Pertinent History


Cardiac: Cardiac Catheterization, Cardiac Stent


Respiratory: No Pertinent History


Gastrointestinal: No Pertinent History


Genitourinary: No Pertinent History


Musculoskeletal: No Pertinent History


Female Surgical History: Tubal Ligation


Other Surgical History: STENT X1, tonsilectomy





- Social History


Smoking Status: Never smoker


Exposure to second hand smoke: No


Drug Use: none


Patient Lives Alone: No


Significant Family History: heart disease





- Female History


Hx Pregnant Now: No





- Nursing Vital Signs


Nursing Vital Signs: 


                               Initial Vital Signs











Temperature  98.0 F   12/20/20 13:58


 


Pulse Rate  77   12/20/20 13:58


 


Blood Pressure  167/86   12/20/20 13:58


 


O2 Sat by Pulse Oximetry  96   12/20/20 13:58








                                   Pain Scale











Pain Intensity                 5

















- Physical Exam


General Appearance: no apparent distress


Eyes, Ears, Nose, Throat Exam: normal ENT inspection


Neck Exam: normal inspection


Cardiovascular/Respiratory Exam: chest non-tender


Gastrointestinal/Abdominal Exam: non-tender


Back Exam: normal inspection


Hips Exam: bilateral: non-tender


Legs Exam: bilateral leg: non-tender


Knees Exam: bilateral knee: non-tender


Ankle Exam: bilateral ankle: non-tender


Foot Exam: right foot: soft tissue tenderness, swelling


Neuro/Tendon Exam: normal sensation, normal motor functions, normal tendon 

functions


Mental Status Exam: alert, oriented x 3


Skin Exam: normal color


SpO2: 96





- Course


Nursing assessment & vital signs reviewed: Yes





- Radiology Exams


  ** Foot


X-ray Interpretation: Reviewed by me (nondisplaced 4th tarsal fracture)


Ordered Tests: 


                               Active Orders 24 hr











 Category Date Time Status


 


 Cold Application STAT Care  12/20/20 14:03 Active


 


 FOOT (MINIMUM 3 VIEWS) Stat Exams  12/20/20 14:16 Taken














- Progress


Progress: improved, pain not gone completely


Counseled pt/family regarding: diagnosis, need for follow-up, rad results





- Departure


Departure Disposition: Home


Clinical Impression: 


Metatarsal stress fracture of left foot


Qualifiers:


 Encounter type: initial encounter Qualified Code(s): M84.375A - Stress 

fracture, left foot, initial encounter for fracture





Condition: Stable


Critical Care Time: No


Referrals: 


NU SANTAMARIA [Primary Care Provider] - [id number]


Instructions:  Foot Fracture (DC), Contusion (DC)


Additional Instructions: 


Discharge/Care Plan





FARSHAD MINAYA was seen on 12/20/20 in the Emergency Room. The patient was 

counseled regarding Diagnosis,Lab results, Imaging studies, need for follow up 

and when to return to the Emergency Room.





Prescriptions given:





Discharge Note





I have spoken with the patient and/or caregivers. I have explained the patient's

condition, diagnosis and treatment plan based on the information available to me

at this time. I have answered the patient's and/or caregiver's questions and 

addressed any concerns. The patient and/or caregivers have as good understanding

of the patient's diagnosis, condition and treatment plan as can be expected at 

this point. The vital signs have been stable. The patient's condition is stable 

and appropriate for discharge from the emergency department.





The patient will pursue further outpatient evaluation with the primary care 

physician or other designated or consulting physician as outlined in the 

discharge instructions. The patient and/or caregivers are agreeable to this plan

of care and follow-up instructions have been explained in detail. The patient 

and/or caregivers have received these instruction. The patient/and or caregivers

are aware that any significant change in condition or worsening of symptoms s

hould prompt an immediate return to this or the closest emergency department or 

call 911. 





FARSHAD MINAYA was seen on 12/20/20 n the Emergency Room. At that time you were 

treated for an emergent condition, during your visit Laboratory, Radiology 

and/or other procedures may have been ordered. It is very important that you 

follow-up with your Primary Care Physician NU SANTAMARIA within the next 24-48 

hours to review your Emergency Room visit and the final results of testing that 

was ordered.  Some test results such as Urine Cultures, Blood Cultures, and 

other cultures if ordered will not be finalized for 24-48 hours.





If you do not have a Primary Care Provider please call the medical records 

department at [phone] to obtain a copy of your results or you may 

sign into our patient portal to obtain these results by visiting us @ 

[email or web address] and completing the following steps:





1. Click on the Patient Portal link





2. Click the Patient Self Enrollment Link to complete the enrollment form and 

entering your Medical Record Number [id number]





3. Once the enrollment form is completed you will receive an email with a 

temporary ID and password at the email address you provided. 





4. Next choose a user name and password. Your user name must be at least 4 

characters long and your password must be at least 4 characters long.





5. Choose a security question from the list and provide your answer to the 

question.





If you already have signed into the Health Portal you may access your Health 

Care Information  24/7 by the following steps:





1. Login to  our website @ [email or web address]





2. Enter your original user name and password.





FAQS





The [facility] Health Portal is an online tool that contains your Lab Results, 

Radiology Reports, Visit History, Discharge Instructions and Health Summary 





Lab and Radiology Results will not be available for 72 hours on the portal.





The Portal is a secure site, passwords are encryted and URLs are re-written so 

they cannot be copied and pasted. You and authorized family members are the only

ones who can access your Portal. Also there is a timeout feature that protects 

your information if you leave the Portal page open.





If you have technical difficulty please use the Contact Us link on the page this

will allow you to submit any questions you have regarding the Portal or you may 

contact the Medical Record Department at [phone].


Prescriptions: 


Naproxen 375 mg*** [Naprosyn 375 mg***] 375 mg PO Q8H #30 tablet

## 2022-08-11 ENCOUNTER — HOSPITAL ENCOUNTER (OUTPATIENT)
Dept: HOSPITAL 33 - ED | Age: 57
Setting detail: OBSERVATION
LOS: 2 days | Discharge: HOME | End: 2022-08-13
Attending: FAMILY MEDICINE | Admitting: FAMILY MEDICINE
Payer: COMMERCIAL

## 2022-08-11 DIAGNOSIS — Z20.828: ICD-10-CM

## 2022-08-11 DIAGNOSIS — R09.1: Primary | ICD-10-CM

## 2022-08-11 DIAGNOSIS — E11.9: ICD-10-CM

## 2022-08-11 DIAGNOSIS — I25.10: ICD-10-CM

## 2022-08-11 DIAGNOSIS — R74.8: ICD-10-CM

## 2022-08-11 DIAGNOSIS — R07.9: ICD-10-CM

## 2022-08-11 DIAGNOSIS — Z79.899: ICD-10-CM

## 2022-08-11 DIAGNOSIS — E78.00: ICD-10-CM

## 2022-08-11 DIAGNOSIS — I10: ICD-10-CM

## 2022-08-11 LAB
ALBUMIN SERPL-MCNC: 4.3 G/DL (ref 3.5–5)
ALP SERPL-CCNC: 159 U/L (ref 38–126)
ALT SERPL-CCNC: 86 U/L (ref 0–35)
ANION GAP SERPL CALC-SCNC: 15.4 MEQ/L (ref 5–15)
AST SERPL QL: 58 U/L (ref 14–36)
BASOPHILS # BLD AUTO: 0.07 X10^3/UL (ref 0–0.4)
BILIRUB BLD-MCNC: 0.5 MG/DL (ref 0.2–1.3)
BNP SERPL-MCNC: 102 PG/ML (ref 0–900)
BUN SERPL-MCNC: 7 MG/DL (ref 7–17)
CALCIUM SPEC-MCNC: 9.1 MG/DL (ref 8.4–10.2)
CHLORIDE SERPL-SCNC: 105 MMOL/L (ref 98–107)
CO2 SERPL-SCNC: 24 MMOL/L (ref 22–30)
CREAT SERPL-MCNC: 0.63 MG/DL (ref 0.52–1.04)
EOSINOPHIL # BLD AUTO: 0.42 X10^3/UL (ref 0–0.5)
FLUAV AG NPH QL IA: NEGATIVE
FLUBV AG NPH QL IA: NEGATIVE
GFR SERPLBLD BASED ON 1.73 SQ M-ARVRAT: > 60 ML/MIN
GLUCOSE SERPL-MCNC: 84 MG/DL (ref 74–106)
GLUCOSE UR-MCNC: NEGATIVE MG/DL
HCT VFR BLD AUTO: 38 % (ref 35–47)
HGB BLD-MCNC: 12.4 G/DL (ref 12–16)
LYMPHOCYTES # SPEC AUTO: 2.87 X10^3/UL (ref 1–4.6)
MCH RBC QN AUTO: 29.5 PG (ref 26–32)
MCHC RBC AUTO-ENTMCNC: 32.6 G/DL (ref 32–36)
MONOCYTES # BLD AUTO: 0.94 X10^3/UL (ref 0–1.3)
PLATELET # BLD AUTO: 379 X10^3/UL (ref 150–450)
POTASSIUM SERPLBLD-SCNC: 4.1 MMOL/L (ref 3.5–5.1)
PROT SERPL-MCNC: 7.9 G/DL (ref 6.3–8.2)
PROT UR STRIP-MCNC: NEGATIVE MG/DL
RBC # BLD AUTO: 4.2 X10^6/UL (ref 4.1–5.4)
RBC # UR AUTO: (no result) ERY/UL (ref 0–5)
RBC #/AREA URNS HPF: (no result) /HPF (ref 0–2)
RSV AG SPEC QL IA: NEGATIVE
SARS-COV-2 AG RESP QL IA.RAPID: NEGATIVE
SODIUM SERPL-SCNC: 141 MMOL/L (ref 137–145)
UA DIPSTICK PNL UR: (no result)
URINE CULTURED INDICATED?: YES
WBC # BLD AUTO: 10 X10^3/UL (ref 4–10.5)
WBC #/AREA URNS HPF: (no result) /HPF (ref 0–5)

## 2022-08-11 PROCEDURE — 36000 PLACE NEEDLE IN VEIN: CPT

## 2022-08-11 PROCEDURE — 83721 ASSAY OF BLOOD LIPOPROTEIN: CPT

## 2022-08-11 PROCEDURE — 71045 X-RAY EXAM CHEST 1 VIEW: CPT

## 2022-08-11 PROCEDURE — 83036 HEMOGLOBIN GLYCOSYLATED A1C: CPT

## 2022-08-11 PROCEDURE — 94762 N-INVAS EAR/PLS OXIMTRY CONT: CPT

## 2022-08-11 PROCEDURE — 36415 COLL VENOUS BLD VENIPUNCTURE: CPT

## 2022-08-11 PROCEDURE — 83880 ASSAY OF NATRIURETIC PEPTIDE: CPT

## 2022-08-11 PROCEDURE — 84484 ASSAY OF TROPONIN QUANT: CPT

## 2022-08-11 PROCEDURE — 81015 MICROSCOPIC EXAM OF URINE: CPT

## 2022-08-11 PROCEDURE — 80053 COMPREHEN METABOLIC PANEL: CPT

## 2022-08-11 PROCEDURE — 93005 ELECTROCARDIOGRAM TRACING: CPT

## 2022-08-11 PROCEDURE — 93268 ECG RECORD/REVIEW: CPT

## 2022-08-11 PROCEDURE — G0378 HOSPITAL OBSERVATION PER HR: HCPCS

## 2022-08-11 PROCEDURE — 85379 FIBRIN DEGRADATION QUANT: CPT

## 2022-08-11 PROCEDURE — 94760 N-INVAS EAR/PLS OXIMETRY 1: CPT

## 2022-08-11 PROCEDURE — 85025 COMPLETE CBC W/AUTO DIFF WBC: CPT

## 2022-08-11 PROCEDURE — 99285 EMERGENCY DEPT VISIT HI MDM: CPT

## 2022-08-11 PROCEDURE — 87086 URINE CULTURE/COLONY COUNT: CPT

## 2022-08-11 PROCEDURE — 0241U: CPT

## 2022-08-11 PROCEDURE — 82947 ASSAY GLUCOSE BLOOD QUANT: CPT

## 2022-08-11 PROCEDURE — 93041 RHYTHM ECG TRACING: CPT

## 2022-08-11 PROCEDURE — 71250 CT THORAX DX C-: CPT

## 2022-08-11 PROCEDURE — 80061 LIPID PANEL: CPT

## 2022-08-11 RX ADMIN — CLOPIDOGREL BISULFATE SCH MG: 75 TABLET ORAL at 21:22

## 2022-08-11 RX ADMIN — HYDROCODONE BITARTRATE AND ACETAMINOPHEN PRN TAB: 5; 325 TABLET ORAL at 19:55

## 2022-08-11 NOTE — XRAY
Indication: Chest pain and short of breath.



Comparison: September 28, 2019



Portable chest again demonstrates normal heart and lungs.  Bony thorax intact

again with mild degenerative changes.  No new/acute findings.

## 2022-08-11 NOTE — ERPHSYRPT
- History of Present Illness


Time Seen by Provider: 08/11/22 15:55


Patient Subjective Stated Complaint: C/O pain in left chest that radiates to 

left ribs and left lower back. Pain has been intermittent for the past 3 days.


Triage Nursing Assessment: Patient is alert and oriented. She came into the ED 

in a wheelchair. She was able to transfer self from chair to bed with stand by 

assist. Some SOB noted. Skin moist. Patient sitting calmly then will suddenly 

cringe when experiencing sharp pains.


Physician History: 


Patient is a 56-year-old female presents emergency department as a referral from

her primary care doctor for evaluation of chest pain and shortness of breath.  

Patient complains of left-sided chest pain that tends to radiate towards her 

left rib.  Patient denies a history of the same.  Chest pain is associated with 

mild shortness of breath.  No nausea vomiting or diaphoresis at this time.  

Patient states the symptoms have been intermittent for the past 3 days.  No 

trauma.  No fever.  No rash.  Symptoms are mild to moderate in intensity.  No 

specific worsening improving factors.  Patient voices no other complaints or 

concerns at this time.





Portions of this note were created with voice recognition technology.  There may

be grammatical, spelling, punctuation or sound alike errors





Timing/Duration: day(s) (3 days)


Activities at Onset: none


Quality: aching


Location: other (Left chest)


Chest Pain Radiation: no radiation (Radiates to the left ribs)


Severity of Pain-Max: moderate


Severity of Pain-Current: mild


Modifying Factors: Improves With: other (Exertion worsens symptoms.)


Associated Symptoms: other (Shortness of breath)


Prior Chest Pain/Cardiac Workup: no prior chest pain


Nitro Today/Relief: no nitro taken today


Aspirin Treatment Today: no aspirin today


Allergies/Adverse Reactions: 








Sulfa (Sulfonamide Antibiotics) [Sulfa(Sulfonamide Antibiotics)] Allergy (Mild, 

Verified 08/11/22 14:53)


   Hives





Home Medications: 








Buprenorphine HCl [Belbuca] 450 mcg BC BID 02/01/18 [History]


Amitriptyline HCl 200 mg PO HS 04/27/18 [History]


Metoprolol Tartrate 25 mg*** [Lopressor 25MG Tab***] 25 mg PO BID 04/27/18 

[History]


clonazePAM [Clonazepam] 1 tab PO BID PRN PRN 04/27/18 [History]


ondansetron HCL [Ondansetron HCl] 4 mg PO Q6H PRN PRN 04/27/18 [History]


Dapagliflozin Propanediol [Farxiga] 5 mg PO DAILY 12/05/19 [History]


Dulaglutide [Trulicity] 0.75 mg SQ WEEKLY 12/05/19 [History]


Hydrocodone/APAP 5-325 Tab^^^ [Norco 5-325 Tablet^^^] 1 each PO BID MDD 6 

12/05/19 [History]


Levothyroxine Sodium 25 Mcg*** [Synthroid 25 Mcg***] 25 mcg PO DAILY 12/05/19 

[History]





Hx Tetanus, Diphtheria Vaccination/Date Given: Yes


Hx Influenza Vaccination/Date Given: No


Hx Pneumococcal Vaccination/Date Given: No (unsure)


Immunizations Up to Date: Yes





Travel Risk





- International Travel


Have you traveled outside of the country in past 3 weeks: No





- Coronavirus Screening


Are you exhibiting any of the following symptoms?: Yes


Symptoms: Shortness of Breath


Close contact with a COVID-19 positive Pt in past 14-21 Days: No





- Vaccine Status


Have you recieved a Covid-19 vaccination: No





- Review of Systems


Constitutional: No Symptoms, No Fever, No Chills


Eyes: No Symptoms


Ears, Nose, & Throat: No Symptoms


Respiratory: No Symptoms, No Cough, No Dyspnea


Cardiac: No Symptoms, No Chest Pain, No Edema, No Syncope


Abdominal/Gastrointestinal: No Symptoms, No Abdominal Pain, No Nausea, No 

Vomiting, No Diarrhea


Genitourinary Symptoms: No Symptoms, No Dysuria


Musculoskeletal: No Symptoms, No Back Pain, No Neck Pain


Skin: No Symptoms, No Rash


Neurological: No Symptoms, No Dizziness, No Focal Weakness, No Sensory Changes


Psychological: No Symptoms


Endocrine: No Symptoms


Hematologic/Lymphatic: No Symptoms


Immunological/Allergic: No Symptoms


All Other Systems: Reviewed and Negative





- Past Medical History


Pertinent Past Medical History: Yes


Neurological History: No Pertinent History


ENT History: No Pertinent History


Cardiac History: Aneurysm, Coronary Artery Disease, Myocardial Infarction (MI)


Respiratory History: Bronchitis, Sleep Apnea


Endocrine Medical History: Diabetes Type II


Musculoskeletal History: Arthritis


GI Medical History: No Pertinent History


 History: No Pertinent History


Psycho-Social History: Anxiety, Depression


Female Reproductive Disorders: No Pertinent History


Other Medical History: HTN.  Migraine Headaches.  Coronary Stent.  Brain 

Aneurysm





- Past Surgical History


Past Surgical History: Yes


Neuro Surgical History: No Pertinent History


Cardiac: Cardiac Catheterization, Cardiac Stent


Respiratory: No Pertinent History


Gastrointestinal: No Pertinent History


Genitourinary: No Pertinent History


Musculoskeletal: No Pertinent History


Female Surgical History: Tubal Ligation


Other Surgical History: STENT X1, tonsilectomy





- Social History


Smoking Status: Never smoker


Exposure to second hand smoke: No


Drug Use: none


Patient Lives Alone: No


Significant Family History: heart disease





- Nursing Vital Signs


Nursing Vital Signs: 


                               Initial Vital Signs











Temperature  98.8 F   08/11/22 14:54


 


Pulse Rate  76   08/11/22 14:54


 


Respiratory Rate  22   08/11/22 14:54


 


Blood Pressure  175/104   08/11/22 14:54


 


O2 Sat by Pulse Oximetry  97   08/11/22 14:54








                                   Pain Scale











Pain Intensity                 6

















- Physical Exam


General Appearance: no apparent distress, alert


Eye Exam: PERRL/EOMI, eyes nml inspection


Ears, Nose, Throat Exam: normal ENT inspection, moist mucous membranes


Neck Exam: normal inspection, non-tender, supple, full range of motion


Respiratory Exam: normal breath sounds, lungs clear, airway intact, No 

respiratory distress


Cardiovascular Exam: regular rate/rhythm, normal heart sounds, normal peripheral

 pulses


Gastrointestinal/Abdomen Exam: soft, No tenderness, No mass


Back Exam: normal inspection, No CVA tenderness, No vertebral tenderness


Extremity Exam: normal inspection, normal range of motion


Neurologic Exam: alert, oriented x 3, cooperative, normal mood/affect, sensation

 nml, No motor deficits


Skin Exam: normal color, warm, dry


Lymphatic Exam: No adenopathy


**SpO2 Interpretation**: normal


SpO2: 93


O2 Delivery: Room Air





- Course


Nursing assessment & vital signs reviewed: Yes


EKG Interpreted by Me: RATE ([id number]), Sinus Rhythm, NORMAL AXIS, NORMAL INTERVALS (T

 wave inversions anteriorly)





- Radiology Exams


  ** Chest


X-ray Interpretation: Teleradiologist Report (Normal heart lungs bony thorax.  

Some degenerative changes observed bony thorax)


Ordered Tests: 


                               Active Orders 24 hr











 Category Date Time Status


 


 Cardiac Monitor STAT Care  08/11/22 14:55 Active


 


 EKG-ER Only STAT Care  08/11/22 14:54 Active


 


 IV Insertion STAT Care  08/11/22 14:54 Active


 


 Pulse Oximetry (ED) STAT Care  08/11/22 14:54 Active


 


 CHEST 1 VIEW (PORTABLE) Stat Exams  08/11/22 14:55 Completed


 


 CBC W DIFF Stat Lab  08/11/22 15:05 Completed


 


 CMP Stat Lab  08/11/22 15:05 Completed


 


 D-DIMER QUANTITATIVE Stat Lab  08/11/22 15:54 Completed


 


 NT PRO BNP Stat Lab  08/11/22 15:05 Completed


 


 TROPONIN Q4H Lab  08/11/22 15:05 Completed


 


 TROPONIN Q4H Lab  08/11/22 19:00 Ordered


 


 TROPONIN Q4H Lab  08/11/22 23:00 Ordered


 


 UA W/RFX CULTURE Stat Lab  08/11/22 15:45 Ordered








Medication Summary














Discontinued Medications














Generic Name Dose Route Start Last Admin





  Trade Name Freq  PRN Reason Stop Dose Admin


 


Aspirin  324 mg  08/11/22 16:14  08/11/22 16:16





  Aspirin 81 Mg Tab.Chew  PO  08/11/22 16:15  324 mg





  STAT ONE   Administration











Lab/Rad Data: 


                           Laboratory Result Diagrams





                                 08/11/22 15:05 





                                 08/11/22 15:05 





                               Laboratory Results











  08/11/22 08/11/22 08/11/22 Range/Units





  15:54 15:16 15:05 


 


WBC     (4.0-10.5)  x10^3/uL


 


RBC     (4.1-5.4)  x10^6/uL


 


Hgb     (12.0-16.0)  g/dL


 


Hct     (35-47)  %


 


MCV     ()  fL


 


MCH     (26-32)  pg


 


MCHC     (32-36)  g/dL


 


RDW     (11.5-14.0)  %


 


Plt Count     (150-450)  x10^3/uL


 


MPV     (7.5-11.0)  fL


 


Gran %     (36.0-66.0)  %


 


Immature Gran % (Auto)     (0.00-0.4)  %


 


Nucleat RBC Rel Count     (0.00-0.1)  %


 


Eos # (Auto)     (0-0.5)  x10^3/uL


 


Immature Gran # (Auto)     (0.00-0.03)  x10^3u/L


 


Absolute Lymphs (auto)     (1.0-4.6)  x10^3/uL


 


Absolute Monos (auto)     (0.0-1.3)  x10^3/uL


 


Absolute Nucleated RBC     (0.00-0.01)  x10^3u/L


 


Lymphocytes %     (24.0-44.0)  %


 


Monocytes %     (0.0-12.0)  %


 


Eosinophils %     (0.00-5.0)  %


 


Basophils %     (0.0-0.4)  %


 


Absolute Granulocytes     (1.4-6.9)  x10^3/uL


 


Basophils #     (0-0.4)  x10^3/uL


 


D-Dimer  0.35    (0.0-0.50)  mg/L


 


Sodium     (137-145)  mmol/L


 


Potassium     (3.5-5.1)  mmol/L


 


Chloride     ()  mmol/L


 


Carbon Dioxide     (22-30)  mmol/L


 


Anion Gap     (5-15)  MEQ/L


 


BUN     (7-17)  mg/dL


 


Creatinine     (0.52-1.04)  mg/dL


 


Estimated GFR     ML/MIN


 


Glucose     ()  mg/dL


 


Calcium     (8.4-10.2)  mg/dL


 


Total Bilirubin     (0.2-1.3)  mg/dL


 


AST     (14-36)  U/L


 


ALT     (0-35)  U/L


 


Alkaline Phosphatase     ()  U/L


 


Troponin I    < 0.012  (0.000-0.034)  ng/mL


 


NT-Pro-B Natriuret Pep     (0-900)  pg/mL


 


Serum Total Protein     (6.3-8.2)  g/dL


 


Albumin     (3.5-5.0)  g/dL


 


Influenza Type A Ag   NEGATIVE   (NEGATIVE)  


 


Influenza Type B Ag   NEGATIVE   (NEGATIVE)  


 


RSV (PCR)   NEGATIVE   (Negative)  


 


SARS-CoV-2 (PCR)   NEGATIVE   (NEGATIVE)  














  08/11/22 08/11/22 Range/Units





  15:05 15:05 


 


WBC   10.0  (4.0-10.5)  x10^3/uL


 


RBC   4.20  (4.1-5.4)  x10^6/uL


 


Hgb   12.4  (12.0-16.0)  g/dL


 


Hct   38.0  (35-47)  %


 


MCV   90.5  ()  fL


 


MCH   29.5  (26-32)  pg


 


MCHC   32.6  (32-36)  g/dL


 


RDW   12.8  (11.5-14.0)  %


 


Plt Count   379  (150-450)  x10^3/uL


 


MPV   9.8  (7.5-11.0)  fL


 


Gran %   56.6  (36.0-66.0)  %


 


Immature Gran % (Auto)   0.3  (0.00-0.4)  %


 


Nucleat RBC Rel Count   0.0  (0.00-0.1)  %


 


Eos # (Auto)   0.42  (0-0.5)  x10^3/uL


 


Immature Gran # (Auto)   0.03  (0.00-0.03)  x10^3u/L


 


Absolute Lymphs (auto)   2.87  (1.0-4.6)  x10^3/uL


 


Absolute Monos (auto)   0.94  (0.0-1.3)  x10^3/uL


 


Absolute Nucleated RBC   0.00  (0.00-0.01)  x10^3u/L


 


Lymphocytes %   28.8  (24.0-44.0)  %


 


Monocytes %   9.4  (0.0-12.0)  %


 


Eosinophils %   4.2  (0.00-5.0)  %


 


Basophils %   0.7  (0.0-0.4)  %


 


Absolute Granulocytes   5.62  (1.4-6.9)  x10^3/uL


 


Basophils #   0.07  (0-0.4)  x10^3/uL


 


D-Dimer    (0.0-0.50)  mg/L


 


Sodium  141   (137-145)  mmol/L


 


Potassium  4.1   (3.5-5.1)  mmol/L


 


Chloride  105   ()  mmol/L


 


Carbon Dioxide  24   (22-30)  mmol/L


 


Anion Gap  15.4 H   (5-15)  MEQ/L


 


BUN  7   (7-17)  mg/dL


 


Creatinine  0.63   (0.52-1.04)  mg/dL


 


Estimated GFR  > 60.0   ML/MIN


 


Glucose  84   ()  mg/dL


 


Calcium  9.1   (8.4-10.2)  mg/dL


 


Total Bilirubin  0.50   (0.2-1.3)  mg/dL


 


AST  58 H   (14-36)  U/L


 


ALT  86 H   (0-35)  U/L


 


Alkaline Phosphatase  159 H   ()  U/L


 


Troponin I    (0.000-0.034)  ng/mL


 


NT-Pro-B Natriuret Pep  102   (0-900)  pg/mL


 


Serum Total Protein  7.9   (6.3-8.2)  g/dL


 


Albumin  4.3   (3.5-5.0)  g/dL


 


Influenza Type A Ag    (NEGATIVE)  


 


Influenza Type B Ag    (NEGATIVE)  


 


RSV (PCR)    (Negative)  


 


SARS-CoV-2 (PCR)    (NEGATIVE)  














- Progress


Progress: improved


Air Movement: good


Progress Note: 


48-year-old female presents to our ED with left-sided chest pain.  D-dimer 

negative.  Abnormal EKG with T wave inversions anteriorly.  Negative troponin.  

In light of patient's past medical history and risk factors we will admit for 

cardiac rule out.  Plan of care discussed with patient.  She agrees to admission

 [facility] for further evaluation and treatment.





Case discussed with Dr. Pate who accepts admission to observation.





Portions of this note were created with voice recognition technology.  There may

 be grammatical, spelling, punctuation or sound alike errors


08/11/22 16:27





Blood Culture(s) Obtained: No


Antibiotics given: No


Discussed with DrCarmita: Yunier


Will see patient in: hospital (observation)


Counseled pt/family regarding: lab results, diagnosis, rad results





- Departure


Departure Disposition: Observation


Clinical Impression: 


 Chest pain, Acute coronary syndrome





Condition: Stable


Critical Care Time: No


Referrals: 


NU SANTAMARIA [Primary Care Provider] - Follow up/PCP as directed

## 2022-08-12 LAB
CHOLESTANOL SERPL-MCNC: 119 MG/DL (ref 50–200)
HDLC SERPL-MCNC: 30 MG/DL (ref 40–60)
LDLC SERPL DIRECT ASSAY-MCNC: 61 MG/DL (ref 30–100)
TRIGL SERPL-MCNC: 251 MG/DL (ref 30–150)

## 2022-08-12 RX ADMIN — TIZANIDINE HYDROCHLORIDE SCH MG: 4 TABLET ORAL at 22:11

## 2022-08-12 RX ADMIN — LEVOTHYROXINE SODIUM SCH MCG: 25 TABLET ORAL at 07:40

## 2022-08-12 RX ADMIN — TIZANIDINE HYDROCHLORIDE SCH MG: 4 TABLET ORAL at 08:49

## 2022-08-12 RX ADMIN — TIZANIDINE HYDROCHLORIDE SCH MG: 4 TABLET ORAL at 17:12

## 2022-08-12 RX ADMIN — HYDROCODONE BITARTRATE AND ACETAMINOPHEN PRN TAB: 5; 325 TABLET ORAL at 08:49

## 2022-08-12 RX ADMIN — HYDROCODONE BITARTRATE AND ACETAMINOPHEN PRN TAB: 5; 325 TABLET ORAL at 04:00

## 2022-08-12 RX ADMIN — CLOPIDOGREL BISULFATE SCH MG: 75 TABLET ORAL at 22:11

## 2022-08-12 RX ADMIN — TIZANIDINE HYDROCHLORIDE SCH MG: 4 TABLET ORAL at 13:26

## 2022-08-12 RX ADMIN — HYDROCODONE BITARTRATE AND ACETAMINOPHEN PRN TAB: 5; 325 TABLET ORAL at 22:10

## 2022-08-12 RX ADMIN — AMLODIPINE BESYLATE SCH: 5 TABLET ORAL at 08:50

## 2022-08-12 RX ADMIN — METOPROLOL TARTRATE SCH: 25 TABLET, FILM COATED ORAL at 08:50

## 2022-08-12 RX ADMIN — HYDROCODONE BITARTRATE AND ACETAMINOPHEN PRN TAB: 5; 325 TABLET ORAL at 17:14

## 2022-08-12 NOTE — XRAY
Indication: Short of breath 4 days.



Multiple contiguous axial images obtained through the chest without contrast.



Comparison: None



Lungs demonstrates minimal bibasilar subsegmental atelectasis/scarring.  No

suspicious pulmonary mass, infiltrate, effusion, or pneumothorax.  Heart not

enlarged with incidental coronary calcifications.  Aorta is normal in course

and caliber.  No pathologic mediastinal lymphadenopathy.



Bony thorax intact with mild degenerative changes throughout the spine.

Limited upper abdomen including adrenal glands are unremarkable.



Impression:

1.  Minimal bibasilar subsegmental atelectasis/scarring, coronary

calcifications, and mild degenerative spondylosis.

2.  Remaining CT chest without contrast exam is negative.

## 2022-08-12 NOTE — PCM.HP
History of Present Illness





- Chief Complaint


Chief Complaint: ACUTE CORONARY SYNDROME


History of Present Illness: 


 is a 56 year old female pt of Dr. Feliciano who was admitted through ER 

yesterday for chest pain.  She had brought her grandson in to see me and was 

noted to be in pain; I sent her to ER.  Her troponin was neg. D-dimer neg.  cxr 

neg.  nl WBC.  She was admitted for chest pain rule out MI.  Has cardiac hx 

(stent).





Pt's cp 10/10 initially; improved now unless she is moving around.  Pain was L 

sided, now radiating into the L ribs.





- Review of Systems


Cardiac: Chest Pain


Musculoskeletal: Back Pain


All Other Systems: Reviewed and Negative





Medications & Allergies


Home Medications: 


                              Home Medication List





Metoprolol Tartrate 25 mg*** [Lopressor 25MG Tab***] 25 mg PO BID 04/27/18 

[History Confirmed 08/11/22]


ondansetron HCL [Ondansetron HCl] 4 mg PO Q6H PRN PRN 04/27/18 [History 

Confirmed 08/11/22]


Dapagliflozin Propanediol [Farxiga] 5 mg PO DAILY 12/05/19 [History Confirmed 

08/11/22]


Levothyroxine Sodium 25 Mcg*** [Synthroid 25 Mcg***] 25 mcg PO DAILY 12/05/19 

[History Confirmed 08/11/22]


Amlodipine Besylate 5 mg*** [Norvasc 5 mg***] 2.5 mg PO BID 08/11/22 [History 

Confirmed 08/11/22]


Buprenorphine 1 patch TD WEEKLY 08/11/22 [History Confirmed 08/11/22]


Clopidogrel Bisulfate [Plavix] 75 mg PO DAILY 08/11/22 [History Confirmed 

08/11/22]


Dulaglutide [Trulicity] 3 mg SQ WEEKLY 08/11/22 [History Confirmed 08/11/22]


Hydrocodone/Acetaminophen [Hydrocodone-Acetamin 5-325 mg***] 1 tab PO Q4-6HPRN 

PRN MDD 6 08/11/22 [History Confirmed 08/11/22]


Ibandronate Sodium 150 mg PO UD 08/11/22 [History Confirmed 08/11/22]


Tizanidine HCl 4 mg PO QID 08/11/22 [History Confirmed 08/11/22]








Allergies/Adverse Reactions: 


                                    Allergies











Allergy/AdvReac Type Severity Reaction Status Date / Time


 


Sulfa (Sulfonamide Allergy Mild Hives Verified 08/11/22 14:53





Antibiotics)     





[Sulfa(Sulfonamide     





Antibiotics)]     














- Past Medical History


Past Medical History: Yes


Neurological History: No Pertinent History


ENT History: No Pertinent History


Cardiac History: Aneurysm, Coronary Artery Disease, High Cholesterol, 

Hypertension


Respiratory History: Bronchitis, Sleep Apnea


Endocrine Medical History: Diabetes Type II


Musculoskelatal History: Arthritis


GI Medical History: No Pertinent History


 History: No Pertinent History


Pyscho-Social History: Anxiety, Depression


Reproductive Disorders: No Pertinent History


Comment: HTN.  Migraine Headaches.  Coronary Stent.  Brain Aneurysm





- Female History


Are you pregnant now?: No





- Past Surgical History


Past Surgical History: Yes


Neuro Surgical History: No Pertinent History


Cardiac History: Cardiac Catheterization, Cardiac Stent


Respiratory Surgery: No Pertinent History


GI Surgical History: No Pertinent History


Genitourinary Surgical Hx: No Pertinent History


Musculskeletal Surgical Hx: No Pertinent History


Female Surgical History: Tubal Ligation


Other Surgical History: STENT X1, tonsilectomy, BRAIN ANEURYSM





- Social History


Smoking Status: Never smoker


Exposure to second hand smoke: No


Alcohol: None


Drug Use: none


Significant Family History: heart disease





- Physical Exam


Vital Signs: 


                               Vital Signs - 24 hr











  Temp Pulse Pulse Resp BP Pulse Ox


 


 08/12/22 07:56  96.9 F  61   12  101/58  96


 


 08/12/22 04:00  96.9 F  71   19  98/65  96


 


 08/12/22 00:00     18  


 


 08/11/22 23:12       96


 


 08/11/22 20:00  97.5 F  71   18  136/71  96


 


 08/11/22 17:21  98.2 F  69   22  136/95  99


 


 08/11/22 16:31       93 L


 


 08/11/22 16:19   80   17  156/78  96


 


 08/11/22 15:51   64   14  156/75  94 L


 


 08/11/22 14:54  98.8 F  76  76  22  175/104  93 L











General Appearance: no apparent distress, obese


Neurologic Exam: alert, oriented x 3, cooperative


Eye Exam: eyes nml inspection


Ears, Nose, Throat Exam: moist mucous membranes


Neck Exam: normal inspection, non-tender, No lymphadenopathy, No subcutaneous 

emphysema


Respiratory Exam: normal breath sounds, No crackles/rales, No rhonchi, No 

wheezing


Cardiovascular Exam: regular rate/rhythm, normal heart sounds, No murmur


Gastrointestinal/Abdomen Exam: soft, normal bowel sounds, No tenderness, No 

distention, No mass, No guarding, No rebound


Back Exam: normal inspection, CVA tenderness (mild on L), No rash


Extremity Exam: normal inspection, No pedal edema, No swelling





Results





- Labs


Lab/Micro Results: 


                            Lab Results-Last 24 Hours











  08/11/22 08/11/22 08/11/22 Range/Units





  02:19 14:55 15:05 


 


WBC    10.0  (4.0-10.5)  x10^3/uL


 


RBC    4.20  (4.1-5.4)  x10^6/uL


 


Hgb    12.4  (12.0-16.0)  g/dL


 


Hct    38.0  (35-47)  %


 


MCV    90.5  ()  fL


 


MCH    29.5  (26-32)  pg


 


MCHC    32.6  (32-36)  g/dL


 


RDW    12.8  (11.5-14.0)  %


 


Plt Count    379  (150-450)  x10^3/uL


 


MPV    9.8  (7.5-11.0)  fL


 


Gran %    56.6  (36.0-66.0)  %


 


Immature Gran % (Auto)    0.3  (0.00-0.4)  %


 


Nucleat RBC Rel Count    0.0  (0.00-0.1)  %


 


Eos # (Auto)    0.42  (0-0.5)  x10^3/uL


 


Immature Gran # (Auto)    0.03  (0.00-0.03)  x10^3u/L


 


Absolute Lymphs (auto)    2.87  (1.0-4.6)  x10^3/uL


 


Absolute Monos (auto)    0.94  (0.0-1.3)  x10^3/uL


 


Absolute Nucleated RBC    0.00  (0.00-0.01)  x10^3u/L


 


Lymphocytes %    28.8  (24.0-44.0)  %


 


Monocytes %    9.4  (0.0-12.0)  %


 


Eosinophils %    4.2  (0.00-5.0)  %


 


Basophils %    0.7  (0.0-0.4)  %


 


Absolute Granulocytes    5.62  (1.4-6.9)  x10^3/uL


 


Basophils #    0.07  (0-0.4)  x10^3/uL


 


D-Dimer     (0.0-0.50)  mg/L


 


Sodium     (137-145)  mmol/L


 


Potassium     (3.5-5.1)  mmol/L


 


Chloride     ()  mmol/L


 


Carbon Dioxide     (22-30)  mmol/L


 


Anion Gap     (5-15)  MEQ/L


 


BUN     (7-17)  mg/dL


 


Creatinine     (0.52-1.04)  mg/dL


 


Estimated GFR     ML/MIN


 


Glucose     ()  mg/dL


 


POC Glucometer     (74 to 106)  mg/dL


 


Hemoglobin A1c   5.33   (4.5-6.0)  %


 


Calcium     (8.4-10.2)  mg/dL


 


Total Bilirubin     (0.2-1.3)  mg/dL


 


AST     (14-36)  U/L


 


ALT     (0-35)  U/L


 


Alkaline Phosphatase     ()  U/L


 


Troponin I  < 0.012    (0.000-0.034)  ng/mL


 


NT-Pro-B Natriuret Pep     (0-900)  pg/mL


 


Serum Total Protein     (6.3-8.2)  g/dL


 


Albumin     (3.5-5.0)  g/dL


 


Triglycerides     ()  mg/dL


 


Cholesterol     ()  mg/dL


 


LDL Cholesterol     ()  mg/dL


 


HDL Cholesterol     (40-60)  mg/dL


 


Heart Disease Risk Ratio     


 


Urinalys Dipstick Clnc     


 


Urine Color     (YELLOW)  


 


Urine Appearance     (CLEAR)  


 


Urine pH     (5-6)  


 


Ur Specific Gravity     (1.005-1.025)  


 


POC Urine Protein Conf     (Negative)  


 


Urine Ketones     (NEGATIVE)  


 


Urine Nitrite     (NEGATIVE)  


 


Urine Bilirubin     (NEGATIVE)  


 


Urine Urobilinogen     (0-1)  mg/dL


 


Urine Leukocytes     (NEGATIVE)  


 


Urine WBC (Auto)     (0-5)  /HPF


 


Urine RBC (Auto)     (0-2)  /HPF


 


U Epithel Cells (Auto)     (FEW)  /HPF


 


Urine Bacteria (Auto)     (NEGATIVE)  /HPF


 


Urine RBC     (0-5)  Darwin/ul


 


Ur Culture Indicated?     


 


Urine Glucose     (NEGATIVE)  mg/dL


 


Influenza Type A Ag     (NEGATIVE)  


 


Influenza Type B Ag     (NEGATIVE)  


 


RSV (PCR)     (Negative)  


 


SARS-CoV-2 (PCR)     (NEGATIVE)  














  08/11/22 08/11/22 08/11/22 Range/Units





  15:05 15:05 15:16 


 


WBC     (4.0-10.5)  x10^3/uL


 


RBC     (4.1-5.4)  x10^6/uL


 


Hgb     (12.0-16.0)  g/dL


 


Hct     (35-47)  %


 


MCV     ()  fL


 


MCH     (26-32)  pg


 


MCHC     (32-36)  g/dL


 


RDW     (11.5-14.0)  %


 


Plt Count     (150-450)  x10^3/uL


 


MPV     (7.5-11.0)  fL


 


Gran %     (36.0-66.0)  %


 


Immature Gran % (Auto)     (0.00-0.4)  %


 


Nucleat RBC Rel Count     (0.00-0.1)  %


 


Eos # (Auto)     (0-0.5)  x10^3/uL


 


Immature Gran # (Auto)     (0.00-0.03)  x10^3u/L


 


Absolute Lymphs (auto)     (1.0-4.6)  x10^3/uL


 


Absolute Monos (auto)     (0.0-1.3)  x10^3/uL


 


Absolute Nucleated RBC     (0.00-0.01)  x10^3u/L


 


Lymphocytes %     (24.0-44.0)  %


 


Monocytes %     (0.0-12.0)  %


 


Eosinophils %     (0.00-5.0)  %


 


Basophils %     (0.0-0.4)  %


 


Absolute Granulocytes     (1.4-6.9)  x10^3/uL


 


Basophils #     (0-0.4)  x10^3/uL


 


D-Dimer     (0.0-0.50)  mg/L


 


Sodium  141    (137-145)  mmol/L


 


Potassium  4.1    (3.5-5.1)  mmol/L


 


Chloride  105    ()  mmol/L


 


Carbon Dioxide  24    (22-30)  mmol/L


 


Anion Gap  15.4 H    (5-15)  MEQ/L


 


BUN  7    (7-17)  mg/dL


 


Creatinine  0.63    (0.52-1.04)  mg/dL


 


Estimated GFR  > 60.0    ML/MIN


 


Glucose  84    ()  mg/dL


 


POC Glucometer     (74 to 106)  mg/dL


 


Hemoglobin A1c     (4.5-6.0)  %


 


Calcium  9.1    (8.4-10.2)  mg/dL


 


Total Bilirubin  0.50    (0.2-1.3)  mg/dL


 


AST  58 H    (14-36)  U/L


 


ALT  86 H    (0-35)  U/L


 


Alkaline Phosphatase  159 H    ()  U/L


 


Troponin I   < 0.012   (0.000-0.034)  ng/mL


 


NT-Pro-B Natriuret Pep  102    (0-900)  pg/mL


 


Serum Total Protein  7.9    (6.3-8.2)  g/dL


 


Albumin  4.3    (3.5-5.0)  g/dL


 


Triglycerides     ()  mg/dL


 


Cholesterol     ()  mg/dL


 


LDL Cholesterol     ()  mg/dL


 


HDL Cholesterol     (40-60)  mg/dL


 


Heart Disease Risk Ratio     


 


Urinalys Dipstick Clnc     


 


Urine Color     (YELLOW)  


 


Urine Appearance     (CLEAR)  


 


Urine pH     (5-6)  


 


Ur Specific Gravity     (1.005-1.025)  


 


POC Urine Protein Conf     (Negative)  


 


Urine Ketones     (NEGATIVE)  


 


Urine Nitrite     (NEGATIVE)  


 


Urine Bilirubin     (NEGATIVE)  


 


Urine Urobilinogen     (0-1)  mg/dL


 


Urine Leukocytes     (NEGATIVE)  


 


Urine WBC (Auto)     (0-5)  /HPF


 


Urine RBC (Auto)     (0-2)  /HPF


 


U Epithel Cells (Auto)     (FEW)  /HPF


 


Urine Bacteria (Auto)     (NEGATIVE)  /HPF


 


Urine RBC     (0-5)  Darwin/ul


 


Ur Culture Indicated?     


 


Urine Glucose     (NEGATIVE)  mg/dL


 


Influenza Type A Ag    NEGATIVE  (NEGATIVE)  


 


Influenza Type B Ag    NEGATIVE  (NEGATIVE)  


 


RSV (PCR)    NEGATIVE  (Negative)  


 


SARS-CoV-2 (PCR)    NEGATIVE  (NEGATIVE)  














  08/11/22 08/11/22 08/11/22 Range/Units





  15:45 15:54 19:09 


 


WBC     (4.0-10.5)  x10^3/uL


 


RBC     (4.1-5.4)  x10^6/uL


 


Hgb     (12.0-16.0)  g/dL


 


Hct     (35-47)  %


 


MCV     ()  fL


 


MCH     (26-32)  pg


 


MCHC     (32-36)  g/dL


 


RDW     (11.5-14.0)  %


 


Plt Count     (150-450)  x10^3/uL


 


MPV     (7.5-11.0)  fL


 


Gran %     (36.0-66.0)  %


 


Immature Gran % (Auto)     (0.00-0.4)  %


 


Nucleat RBC Rel Count     (0.00-0.1)  %


 


Eos # (Auto)     (0-0.5)  x10^3/uL


 


Immature Gran # (Auto)     (0.00-0.03)  x10^3u/L


 


Absolute Lymphs (auto)     (1.0-4.6)  x10^3/uL


 


Absolute Monos (auto)     (0.0-1.3)  x10^3/uL


 


Absolute Nucleated RBC     (0.00-0.01)  x10^3u/L


 


Lymphocytes %     (24.0-44.0)  %


 


Monocytes %     (0.0-12.0)  %


 


Eosinophils %     (0.00-5.0)  %


 


Basophils %     (0.0-0.4)  %


 


Absolute Granulocytes     (1.4-6.9)  x10^3/uL


 


Basophils #     (0-0.4)  x10^3/uL


 


D-Dimer   0.35   (0.0-0.50)  mg/L


 


Sodium     (137-145)  mmol/L


 


Potassium     (3.5-5.1)  mmol/L


 


Chloride     ()  mmol/L


 


Carbon Dioxide     (22-30)  mmol/L


 


Anion Gap     (5-15)  MEQ/L


 


BUN     (7-17)  mg/dL


 


Creatinine     (0.52-1.04)  mg/dL


 


Estimated GFR     ML/MIN


 


Glucose     ()  mg/dL


 


POC Glucometer     (74 to 106)  mg/dL


 


Hemoglobin A1c     (4.5-6.0)  %


 


Calcium     (8.4-10.2)  mg/dL


 


Total Bilirubin     (0.2-1.3)  mg/dL


 


AST     (14-36)  U/L


 


ALT     (0-35)  U/L


 


Alkaline Phosphatase     ()  U/L


 


Troponin I    < 0.012  (0.000-0.034)  ng/mL


 


NT-Pro-B Natriuret Pep     (0-900)  pg/mL


 


Serum Total Protein     (6.3-8.2)  g/dL


 


Albumin     (3.5-5.0)  g/dL


 


Triglycerides     ()  mg/dL


 


Cholesterol     ()  mg/dL


 


LDL Cholesterol     ()  mg/dL


 


HDL Cholesterol     (40-60)  mg/dL


 


Heart Disease Risk Ratio     


 


Urinalys Dipstick Clnc  MAIN LAB    


 


Urine Color  YELLOW    (YELLOW)  


 


Urine Appearance  SLIGHTLY CLOUDY    (CLEAR)  


 


Urine pH  5.5    (5-6)  


 


Ur Specific Gravity  <=1.005    (1.005-1.025)  


 


POC Urine Protein Conf  NEGATIVE    (Negative)  


 


Urine Ketones  NEGATIVE    (NEGATIVE)  


 


Urine Nitrite  NEGATIVE    (NEGATIVE)  


 


Urine Bilirubin  NEGATIVE    (NEGATIVE)  


 


Urine Urobilinogen  0.2    (0-1)  mg/dL


 


Urine Leukocytes  LARGE    (NEGATIVE)  


 


Urine WBC (Auto)  26-50    (0-5)  /HPF


 


Urine RBC (Auto)  6-10    (0-2)  /HPF


 


U Epithel Cells (Auto)  RARE    (FEW)  /HPF


 


Urine Bacteria (Auto)  RARE    (NEGATIVE)  /HPF


 


Urine RBC  TRACE-INTACT    (0-5)  Darwin/ul


 


Ur Culture Indicated?  YES    


 


Urine Glucose  NEGATIVE    (NEGATIVE)  mg/dL


 


Influenza Type A Ag     (NEGATIVE)  


 


Influenza Type B Ag     (NEGATIVE)  


 


RSV (PCR)     (Negative)  


 


SARS-CoV-2 (PCR)     (NEGATIVE)  














  08/11/22 08/12/22 08/12/22 Range/Units





  20:23 02:19 07:08 


 


WBC     (4.0-10.5)  x10^3/uL


 


RBC     (4.1-5.4)  x10^6/uL


 


Hgb     (12.0-16.0)  g/dL


 


Hct     (35-47)  %


 


MCV     ()  fL


 


MCH     (26-32)  pg


 


MCHC     (32-36)  g/dL


 


RDW     (11.5-14.0)  %


 


Plt Count     (150-450)  x10^3/uL


 


MPV     (7.5-11.0)  fL


 


Gran %     (36.0-66.0)  %


 


Immature Gran % (Auto)     (0.00-0.4)  %


 


Nucleat RBC Rel Count     (0.00-0.1)  %


 


Eos # (Auto)     (0-0.5)  x10^3/uL


 


Immature Gran # (Auto)     (0.00-0.03)  x10^3u/L


 


Absolute Lymphs (auto)     (1.0-4.6)  x10^3/uL


 


Absolute Monos (auto)     (0.0-1.3)  x10^3/uL


 


Absolute Nucleated RBC     (0.00-0.01)  x10^3u/L


 


Lymphocytes %     (24.0-44.0)  %


 


Monocytes %     (0.0-12.0)  %


 


Eosinophils %     (0.00-5.0)  %


 


Basophils %     (0.0-0.4)  %


 


Absolute Granulocytes     (1.4-6.9)  x10^3/uL


 


Basophils #     (0-0.4)  x10^3/uL


 


D-Dimer     (0.0-0.50)  mg/L


 


Sodium     (137-145)  mmol/L


 


Potassium     (3.5-5.1)  mmol/L


 


Chloride     ()  mmol/L


 


Carbon Dioxide     (22-30)  mmol/L


 


Anion Gap     (5-15)  MEQ/L


 


BUN     (7-17)  mg/dL


 


Creatinine     (0.52-1.04)  mg/dL


 


Estimated GFR     ML/MIN


 


Glucose     ()  mg/dL


 


POC Glucometer  106   73 L  (74 to 106)  mg/dL


 


Hemoglobin A1c     (4.5-6.0)  %


 


Calcium     (8.4-10.2)  mg/dL


 


Total Bilirubin     (0.2-1.3)  mg/dL


 


AST     (14-36)  U/L


 


ALT     (0-35)  U/L


 


Alkaline Phosphatase     ()  U/L


 


Troponin I     (0.000-0.034)  ng/mL


 


NT-Pro-B Natriuret Pep     (0-900)  pg/mL


 


Serum Total Protein     (6.3-8.2)  g/dL


 


Albumin     (3.5-5.0)  g/dL


 


Triglycerides   251 H   ()  mg/dL


 


Cholesterol   119   ()  mg/dL


 


LDL Cholesterol   61   ()  mg/dL


 


HDL Cholesterol   30 L   (40-60)  mg/dL


 


Heart Disease Risk Ratio   4.0   


 


Urinalys Dipstick Clnc     


 


Urine Color     (YELLOW)  


 


Urine Appearance     (CLEAR)  


 


Urine pH     (5-6)  


 


Ur Specific Gravity     (1.005-1.025)  


 


POC Urine Protein Conf     (Negative)  


 


Urine Ketones     (NEGATIVE)  


 


Urine Nitrite     (NEGATIVE)  


 


Urine Bilirubin     (NEGATIVE)  


 


Urine Urobilinogen     (0-1)  mg/dL


 


Urine Leukocytes     (NEGATIVE)  


 


Urine WBC (Auto)     (0-5)  /HPF


 


Urine RBC (Auto)     (0-2)  /HPF


 


U Epithel Cells (Auto)     (FEW)  /HPF


 


Urine Bacteria (Auto)     (NEGATIVE)  /HPF


 


Urine RBC     (0-5)  Darwin/ul


 


Ur Culture Indicated?     


 


Urine Glucose     (NEGATIVE)  mg/dL


 


Influenza Type A Ag     (NEGATIVE)  


 


Influenza Type B Ag     (NEGATIVE)  


 


RSV (PCR)     (Negative)  


 


SARS-CoV-2 (PCR)     (NEGATIVE)  








                                   Accuchecks











Date                           08/12/22


 


Date                           08/11/22


 


Time                           07:30


 


Time                           21:05

















- Radiology Impressions


Radiology Exams & Impressions: 


                              Radiology Procedures











 Category Date Time Status


 


 CHEST 1 VIEW (PORTABLE) Stat Exams  08/11/22 14:55 Completed


 


 CHEST WITHOUT CONTRAST [CT] Routine Exams  08/12/22 08:23 Completed














- Other Procedures and Tests


                               Respiratory Therapy





08/12/22 05:00


EKG ROUTINE 





08/13/22 05:00


EKG ROUTINE 





08/14/22 05:00


EKG ROUTINE 














Assessment/Plan


(1) Chest pain


Current Visit: Yes   Status: Acute   


Qualifiers: 


   Chest pain type: other chest pain   Qualified Code(s): R07.89 - Other chest 

pain; R07.8 - Other chest pain   


Assessment & Plan: 


has ruled out for MI.  May be pleurisy - start treating with steroid po.  May be

 able to d/c home later today, but if pain persists may need to stay another 

day.  Check CT chest.


Code(s): R07.9 - CHEST PAIN, UNSPECIFIED

## 2022-08-13 VITALS — HEART RATE: 71 BPM | OXYGEN SATURATION: 93 % | DIASTOLIC BLOOD PRESSURE: 70 MMHG | SYSTOLIC BLOOD PRESSURE: 146 MMHG

## 2022-08-13 LAB
ALBUMIN SERPL-MCNC: 4.1 G/DL (ref 3.5–5)
ALP SERPL-CCNC: 132 U/L (ref 38–126)
ALT SERPL-CCNC: 65 U/L (ref 0–35)
ANION GAP SERPL CALC-SCNC: 13.5 MEQ/L (ref 5–15)
AST SERPL QL: 41 U/L (ref 14–36)
BILIRUB BLD-MCNC: 0.5 MG/DL (ref 0.2–1.3)
BUN SERPL-MCNC: 15 MG/DL (ref 7–17)
CALCIUM SPEC-MCNC: 9.1 MG/DL (ref 8.4–10.2)
CHLORIDE SERPL-SCNC: 103 MMOL/L (ref 98–107)
CO2 SERPL-SCNC: 28 MMOL/L (ref 22–30)
CREAT SERPL-MCNC: 0.62 MG/DL (ref 0.52–1.04)
GFR SERPLBLD BASED ON 1.73 SQ M-ARVRAT: > 60 ML/MIN
GLUCOSE SERPL-MCNC: 88 MG/DL (ref 74–106)
POTASSIUM SERPLBLD-SCNC: 3.6 MMOL/L (ref 3.5–5.1)
PROT SERPL-MCNC: 7.7 G/DL (ref 6.3–8.2)
SODIUM SERPL-SCNC: 141 MMOL/L (ref 137–145)

## 2022-08-13 RX ADMIN — METOPROLOL TARTRATE SCH: 25 TABLET, FILM COATED ORAL at 11:12

## 2022-08-13 RX ADMIN — AMLODIPINE BESYLATE SCH: 5 TABLET ORAL at 11:12

## 2022-08-13 RX ADMIN — TIZANIDINE HYDROCHLORIDE SCH MG: 4 TABLET ORAL at 08:28

## 2022-08-13 RX ADMIN — LEVOTHYROXINE SODIUM SCH MCG: 25 TABLET ORAL at 07:09

## 2022-08-13 RX ADMIN — METOPROLOL TARTRATE SCH: 25 TABLET, FILM COATED ORAL at 00:13

## 2022-08-13 RX ADMIN — AMLODIPINE BESYLATE SCH: 5 TABLET ORAL at 00:13

## 2022-08-13 RX ADMIN — TIZANIDINE HYDROCHLORIDE SCH MG: 4 TABLET ORAL at 13:17

## 2022-08-13 RX ADMIN — HYDROCODONE BITARTRATE AND ACETAMINOPHEN PRN TAB: 5; 325 TABLET ORAL at 08:28

## 2022-08-13 NOTE — PCM.DS
Discharge Summary


Date of Admission: 


08/11/22 17:13





Admitting Physician: 


GUERLINE LU





Primary Care Provider: 


NU SANTAMARIA








Allergies


Allergies





Sulfa (Sulfonamide Antibiotics) [Sulfa(Sulfonamide Antibiotics)] Allergy (Mild, 

Verified 08/11/22 14:53)


   [facility] Summary





- Hospital Course


Hospital Course: 





Pt was admitted through ER with chest pain to rule out MI.  Her troponins were 

negative.  CT of the chest was nonacute (d-dimer had been negative).  CXR non 

acute.  She was given po steroid and ibuprofen for presumed pleurisy and her 

pain is better today, 4/10.  Will be discharged to home on po steroid.





Her BP meds have been held several times while in the hospital; apparently at 

home she has only been taking them prn, so will have her stop her amlodipine and

decrease her lopressor from 25mg po BID to 25mg 1/2 po BID.





She had mild increase in LFTs which decreased during her stay - will need 

rechecked outpatient.





F/u with me in 1-2 weeks.





- Vitals & Intake/Output


Vital Signs: 





                                   Vital Signs











Temperature  98.1 F   08/13/22 11:51


 


Pulse Rate  71   08/13/22 11:51


 


Respiratory Rate  16   08/13/22 11:51


 


Blood Pressure  146/70   08/13/22 11:51


 


O2 Sat by Pulse Oximetry  93 L  08/13/22 11:51











Intake & Output: 





                                 Intake & Output











 08/11/22 08/12/22 08/13/22 08/14/22





 11:59 11:59 11:59 11:59


 


Intake Total  580 540 


 


Output Total  600 2300 


 


Balance  -20 -1760 


 


Weight  84.7 kg 84.9 kg 














- Lab


Result Diagrams: 


                                 08/11/22 15:05





                                 08/13/22 08:40


Lab Results-Last 24 Hrs: 





                            Lab Results-Last 24 Hours











  08/12/22 08/12/22 08/13/22 Range/Units





  15:54 21:06 07:09 


 


Sodium     (137-145)  mmol/L


 


Potassium     (3.5-5.1)  mmol/L


 


Chloride     ()  mmol/L


 


Carbon Dioxide     (22-30)  mmol/L


 


Anion Gap     (5-15)  MEQ/L


 


BUN     (7-17)  mg/dL


 


Creatinine     (0.52-1.04)  mg/dL


 


Estimated GFR     ML/MIN


 


Glucose     ()  mg/dL


 


POC Glucometer  122 H  125 H  91  (74 to 106)  mg/dL


 


Calcium     (8.4-10.2)  mg/dL


 


Total Bilirubin     (0.2-1.3)  mg/dL


 


AST     (14-36)  U/L


 


ALT     (0-35)  U/L


 


Alkaline Phosphatase     ()  U/L


 


Serum Total Protein     (6.3-8.2)  g/dL


 


Albumin     (3.5-5.0)  g/dL














  08/13/22 08/13/22 Range/Units





  08:40 11:20 


 


Sodium  141   (137-145)  mmol/L


 


Potassium  3.6   (3.5-5.1)  mmol/L


 


Chloride  103   ()  mmol/L


 


Carbon Dioxide  28   (22-30)  mmol/L


 


Anion Gap  13.5   (5-15)  MEQ/L


 


BUN  15   (7-17)  mg/dL


 


Creatinine  0.62   (0.52-1.04)  mg/dL


 


Estimated GFR  > 60.0   ML/MIN


 


Glucose  88   ()  mg/dL


 


POC Glucometer   82  (74 to 106)  mg/dL


 


Calcium  9.1   (8.4-10.2)  mg/dL


 


Total Bilirubin  0.50   (0.2-1.3)  mg/dL


 


AST  41 H   (14-36)  U/L


 


ALT  65 H   (0-35)  U/L


 


Alkaline Phosphatase  132 H   ()  U/L


 


Serum Total Protein  7.7   (6.3-8.2)  g/dL


 


Albumin  4.1   (3.5-5.0)  g/dL











Micro Results-Entire Visit: 





                                   Accuchecks











Date                           08/13/22


 


Date                           08/13/22


 


Date                           08/12/22


 


Time                           11:48


 


Time                           07:43


 


Time                           16:08

















- Radiology Exams


Ordered Rad Exams-Entire Visit: 





                              Radiology Procedures











 Category Date Time Status


 


 CHEST 1 VIEW (PORTABLE) Stat Exams  08/11/22 14:55 Completed


 


 CHEST WITHOUT CONTRAST [CT] Routine Exams  08/12/22 08:23 Completed














- Procedures and Test


Procedures and Tests throughout Hospitalization: 





                            Therapy Orders & Screens





08/11/22 22:52


EKG ROUTINE 


   Comment: 


   Diagnosis: Acute coronary syndrome





08/12/22 05:00


EKG ROUTINE 


   Comment: 


   Diagnosis: Acute coronary syndrome





08/13/22 05:00


EKG ROUTINE 


   Comment: 


   Diagnosis: Acute coronary syndrome





08/14/22 05:00


EKG ROUTINE 


   Comment: 


   Diagnosis: Acute coronary syndrome














Discharge Exam


General Appearance: no apparent distress, alert


Neurologic Exam: oriented x 3, cooperative


Eye Exam: eyes nml inspection


Ears, Nose, Throat Exam: moist mucous membranes


Neck Exam: normal inspection


Respiratory Exam: normal breath sounds, lungs clear, No prolonged expirations, 

No crackles/rales, No wheezing


Cardiovascular Exam: regular rate/rhythm, normal heart sounds, No murmur


Gastrointestinal/Abdomen Exam: soft, normal bowel sounds, No tenderness, No 

distention, No mass, No guarding, No rebound


Extremity Exam: normal inspection, No pedal edema, No swelling


Skin Exam: normal color, warm, dry, No rash





Final Diagnosis/Problem List





- Final Discharge Diagnosis/Problem


(1) Pleurisy


Current Visit: Yes   Status: Acute   


Assessment & Plan: 


Home on prednisone to finish 1 week.


Code(s): R09.1 - PLEURISY   





(2) Chest pain


Current Visit: Yes   Status: Acute   


Assessment & Plan: 


MI and PE ruled out.  


Code(s): R07.9 - CHEST PAIN, UNSPECIFIED   





(3) Hypertension


Current Visit: Yes   Status: Acute   


Assessment & Plan: 


stop amlodipine, and half metoprolol 25mg po BID. 


Code(s): I10 - ESSENTIAL (PRIMARY) HYPERTENSION   





(4) Elevated liver enzymes


Current Visit: Yes   Status: Acute   


Assessment & Plan: 


recheck 1 mo; decreased somewhat today.


Code(s): R74.8 - ABNORMAL LEVELS OF OTHER SERUM ENZYMES   





- Discharge


Disposition: Home, Self-Care


Condition: Good


Prescriptions: 


New


   Prednisone 20 mg*** [Deltasone 20 mg***] 20 mg PO DAILY #14 tablet





Continue


   ondansetron HCL [Ondansetron HCl] 4 mg PO Q6H PRN PRN


     PRN Reason: Nausea


   Levothyroxine Sodium 25 Mcg*** [Synthroid 25 Mcg***] 25 mcg PO DAILY


   Dapagliflozin Propanediol [Farxiga] 5 mg PO DAILY


   Buprenorphine 1 patch TD WEEKLY


   Ibandronate Sodium 150 mg PO UD


   Hydrocodone/Acetaminophen [Hydrocodone-Acetamin 5-325 mg***] 1 tab PO Q4-6H

PRN PRN MDD 6


     PRN Reason: Pain


   Clopidogrel Bisulfate [Plavix] 75 mg PO DAILY


   Tizanidine HCl 4 mg PO QID


   Dulaglutide [Trulicity] 3 mg SQ WEEKLY





Changed


   Metoprolol Tartrate 25 mg*** [Lopressor 25MG Tab***] 12.5 mg PO BID #30 

tablet





Discontinued


   Amlodipine Besylate 5 mg*** [Norvasc 5 mg***] 2.5 mg PO BID


Follow up with: 


NU SANTAMARIA [Primary Care Provider] -

## 2024-12-01 ENCOUNTER — HOSPITAL ENCOUNTER (EMERGENCY)
Dept: HOSPITAL 33 - ED | Age: 59
Discharge: HOME | End: 2024-12-01
Payer: COMMERCIAL

## 2024-12-01 VITALS
RESPIRATION RATE: 16 BRPM | SYSTOLIC BLOOD PRESSURE: 97 MMHG | HEART RATE: 50 BPM | OXYGEN SATURATION: 96 % | DIASTOLIC BLOOD PRESSURE: 58 MMHG

## 2024-12-01 VITALS — TEMPERATURE: 97.4 F

## 2024-12-01 DIAGNOSIS — R41.3: Primary | ICD-10-CM

## 2024-12-01 DIAGNOSIS — Z79.01: ICD-10-CM

## 2024-12-01 DIAGNOSIS — R51.9: ICD-10-CM

## 2024-12-01 DIAGNOSIS — Z79.899: ICD-10-CM

## 2024-12-01 DIAGNOSIS — E11.9: ICD-10-CM

## 2024-12-01 DIAGNOSIS — R74.8: ICD-10-CM

## 2024-12-01 DIAGNOSIS — R41.0: ICD-10-CM

## 2024-12-01 DIAGNOSIS — N30.00: ICD-10-CM

## 2024-12-01 LAB
ALBUMIN SERPL-MCNC: 4 G/DL (ref 3.5–5)
ALP SERPL-CCNC: 148 U/L (ref 38–126)
ALT SERPL-CCNC: 89 U/L (ref 0–35)
ANION GAP SERPL CALC-SCNC: 12.8 MEQ/L (ref 5–15)
AST SERPL QL: 95 U/L (ref 14–36)
BASOPHILS # BLD AUTO: 0.06 X10^3/UL (ref 0.01–0.08)
BASOPHILS NFR BLD AUTO: 0.8 % (ref 0.1–1.2)
BILIRUB BLD-MCNC: 0.6 MG/DL (ref 0.2–1.3)
BUN SERPL-MCNC: 12 MG/DL (ref 7–17)
CALCIUM SPEC-MCNC: 9 MG/DL (ref 8.4–10.2)
CHLORIDE SERPL-SCNC: 108 MMOL/L (ref 98–107)
CO2 SERPL-SCNC: 21 MMOL/L (ref 22–30)
CREAT SERPL-MCNC: 0.72 MG/DL (ref 0.52–1.04)
EOSINOPHIL # BLD AUTO: 0.36 X10^3/UL (ref 0.04–0.36)
GFR SERPLBLD BASED ON 1.73 SQ M-ARVRAT: 95.1 ML/MIN
GLUCOSE SERPL-MCNC: 86 MG/DL (ref 74–106)
HCT VFR BLD AUTO: 37.3 % (ref 34.1–44.9)
HGB BLD-MCNC: 12.2 G/DL (ref 11.2–15.7)
IMM GRANULOCYTES # BLD: 0.03 X10^3U/L (ref 0–0.03)
IMM GRANULOCYTES NFR BLD: 0.4 % (ref 0–0.43)
LYMPHOCYTES # SPEC AUTO: 2.28 X10^3/UL (ref 1.18–3.74)
MCH RBC QN AUTO: 29.6 PG (ref 25.6–32.2)
MCHC RBC AUTO-ENTMCNC: 32.7 G/DL (ref 32.2–35.5)
MONOCYTES # BLD AUTO: 0.85 X10^3/UL (ref 0.24–0.86)
NRBC # BLD AUTO: 0 X10^3U/L (ref 0–0.01)
NRBC BLD AUTO-RTO: 0 % (ref 0–0.2)
PLATELET # BLD AUTO: 333 X10^3/UL (ref 182–369)
POTASSIUM SERPLBLD-SCNC: 3.3 MMOL/L (ref 3.5–5.1)
PROT SERPL-MCNC: 7.5 G/DL (ref 6.3–8.2)
RBC # BLD AUTO: 4.12 X10^6/UL (ref 3.93–5.22)
RBC # URNS HPF: (no result) /HPF (ref 0–5)
SODIUM SERPL-SCNC: 139 MMOL/L (ref 135–145)
WBC # BLD AUTO: 7.5 X10^3/UL (ref 3.98–10.04)

## 2024-12-01 PROCEDURE — 80053 COMPREHEN METABOLIC PANEL: CPT

## 2024-12-01 PROCEDURE — 85025 COMPLETE CBC W/AUTO DIFF WBC: CPT

## 2024-12-01 PROCEDURE — 36415 COLL VENOUS BLD VENIPUNCTURE: CPT

## 2024-12-01 PROCEDURE — 96374 THER/PROPH/DIAG INJ IV PUSH: CPT

## 2024-12-01 PROCEDURE — 81001 URINALYSIS AUTO W/SCOPE: CPT

## 2024-12-01 PROCEDURE — 70450 CT HEAD/BRAIN W/O DYE: CPT

## 2024-12-01 PROCEDURE — 82947 ASSAY GLUCOSE BLOOD QUANT: CPT

## 2024-12-01 PROCEDURE — 87086 URINE CULTURE/COLONY COUNT: CPT

## 2024-12-01 PROCEDURE — 99285 EMERGENCY DEPT VISIT HI MDM: CPT

## 2024-12-01 PROCEDURE — 99284 EMERGENCY DEPT VISIT MOD MDM: CPT

## 2024-12-01 RX ADMIN — ONDANSETRON ONE MG: 2 INJECTION, SOLUTION INTRAMUSCULAR; INTRAVENOUS at 09:55

## 2024-12-01 RX ADMIN — CIPROFLOXACIN ONE MG: 500 TABLET, FILM COATED ORAL at 11:39

## 2024-12-01 NOTE — XRAY
CLINICAL HISTORY: confusion

COMPARISON: 12/05/2019 CT.

TECHNIQUE: Axial non-contrast CT scan of the brain was performed from the

skull base to the high parietal region. One of the following dose reduction

techniques were utilized for this exam: Automated exposure control, adjustment

of the mA and/or kV according to patient size, use of iterative reconstruction.

FINDINGS:

Brain Parenchyma:

 Normal attenuation of the cerebral hemispheres, cerebellum, and brainstem.

No evidence of acute infarct, hemorrhage, or mass effect.

No abnormal areas of hypo- or hyperattenuation.



Ventricular System:

 Ventricles are normal in size and configuration.

No evidence of hydrocephalus or ventricular enlargement.



Subarachnoid Spaces:

 Normal sulci and cisterns.

No evidence of subarachnoid hemorrhage or extra-axial fluid collections.



Cerebellum and Brainstem:

 Normal size and attenuation.



Orbits:

 Normal appearance of the globes, optic nerves, and extraocular muscles.



Sinuses:

 Clear paranasal sinuses.

No evidence of sinusitis or mucosal thickening.



Mastoid Air Cells:

 Clear mastoid air cells.

No evidence of mastoiditis.



Skull and scalp:

 Hyperostosis frontalis interna.

 Normal skull morphology.



 Vessels:

 Bilateral vertebral artery stents in place.

IMPRESSION:

1. No acute intracranial abnormality. MRI with diffusion imaging is advised to

detect acute ischemic stroke if clinically warranted.

2. No significant interval changes.



 [facility] ER was called at [phone] at 9:01 AM

CST, 12/01/2024,and nurse Josette was informed regarding the stroke results.



_____________________________________

Electronically Signed by: Sharon Ward MD. (12/01/2024 10:04:05 EST)

## 2024-12-01 NOTE — ERPHSYRPT
- History of Present Illness


Time Seen by Provider: 12/01/24 10:16


Source: family


Exam Limitations: clinical condition


Patient Subjective Stated Complaint: pt here for confusion that started this am 

according to pt and son,


Triage Nursing Assessment: pt alert,does not remember some events of this 

morning, she does not know date. co headache, no fall. follows commands, gait 

steady, resp easy, skin w/d/p. moves all ext well


Physician History: 





Patient is 59-year-old female with significant past medical history of diabetes 

hypertension history of brain aneurysm started acting confused according to the 

family members since today morning.  Patient family member states that for last 

few months she has off-and-on episode of confusion but today she could not 

remember recent events including that 2 days ago it was a Thanksgiving as well 

as they just recently visited out-of-state visit which she also could not 

remember show she was brought into the emergency room.  In the emergency room 

patient is alert awake recognize me also and all other medical staff.  She 

denies any other symptoms.


Time of Onset/Last Time Seen Normal: early morning around 6 am


Timing/Duration: today


Severity: mild


Character of Deficits: none, other (impaired memory)


Deficits: no difficulties


Baseline/Normal Cognition: alert oriented x 3


Current Cognition: alert oriented x 3


Associated Symptoms: denies symptoms


Allergies/Adverse Reactions: 








Sulfa (Sulfonamide Antibiotics) [Sulfa(Sulfonamide Antibiotics)] Allergy (Mild, 

Verified 12/01/24 09:31)


   Hives





Home Medications: 








ondansetron HCL [Ondansetron HCl] 4 mg PO Q6H PRN PRN 04/27/18 [History]


Dapagliflozin Propanediol [Farxiga] 5 mg PO DAILY 12/05/19 [History]


Levothyroxine Sodium 25 Mcg*** [Synthroid 25 Mcg***] 25 mcg PO DAILY 12/05/19 

[History]


Buprenorphine 1 patch TD WEEKLY 08/11/22 [History]


Clopidogrel Bisulfate [Plavix] 75 mg PO DAILY 08/11/22 [History]


Dulaglutide [Trulicity] 3 mg SQ WEEKLY 08/11/22 [History]


Hydrocodone/Acetaminophen [Hydrocodone-Acetamin 5-325 mg***] 1 tab PO Q4-6HPRN 

PRN MDD 6 08/11/22 [History]


Ibandronate Sodium 150 mg PO UD 08/11/22 [History]


Tizanidine HCl 4 mg PO QID 08/11/22 [History]


Alendronate Sodium [Fosamax] 70 mg PO WEEKLY 02/10/23 [History]


Amlodipine Besylate [Norvasc] 2.5 mg PO DAILY 02/10/23 [History]


Atorvastatin Calcium [Lipitor] 20 mg PO DAILY 02/10/23 [History]


Cetirizine HCl [All Day Allergy Relief] 10 mg PO DAILY 02/10/23 [History]


Diclofenac Sodium 50 mg*** [Voltaren 50 mg***] 50 mg PO BID 02/10/23 [History]


Ergocalciferol (Vitamin D2) [Vitamin D2] 1,250 mcg PO WEEKLY 02/10/23 [History]


Ezetimibe 10 mg** [Zetia 10 MG**] 10 mg PO DAILY 02/10/23 [History]


Hydroxyzine HCl 25 mg*** [Atarax 25 mg***] 25 mg PO HS 02/10/23 [History]


Losartan Potassium 50 mg*** [Cozaar 50 MG***] 50 mg PO BID 02/10/23 [History]


Metoprolol Tartrate 25 mg*** [Lopressor 25MG Tab***] 25 mg PO BID 02/10/23 

[History]


Misoprostol [Cytotec] 200 mcg PO BID 02/10/23 [History]


Tolterodine Tartrate [Detrol] 1 mg PO DAILY 02/10/23 [History]


Topiramate [Trokendi Xr] 200 mg PO DAILY 02/10/23 [History]





Hx Tetanus, Diphtheria Vaccination/Date Given: No


Hx Influenza Vaccination/Date Given: No


Hx Pneumococcal Vaccination/Date Given: No


Immunizations Up to Date: Yes





Travel Risk





- International Travel


Have you traveled outside of the country in past 3 weeks: No





- Emerging Infectious Disease


Are you exhibiting symptoms associated with any current EIDs: No





- Review of Systems


Constitutional: No Symptoms


Eyes: No Symptoms


Ears, Nose, & Throat: No Symptoms


Respiratory: No Symptoms


Cardiac: No Symptoms


Abdominal/Gastrointestinal: No Symptoms


Genitourinary Symptoms: No Symptoms


Musculoskeletal: No Symptoms


Skin: No Symptoms


Neurological: Headache


Psychological: No Symptoms


Endocrine: No Symptoms


Hematologic/Lymphatic: No Symptoms





- Past Medical History


Pertinent Past Medical History: Yes


Neurological History: No Pertinent History


ENT History: No Pertinent History


Cardiac History: Aneurysm, Coronary Artery Disease, High Cholesterol, 

Hypertension


Respiratory History: Bronchitis, Sleep Apnea


Endocrine Medical History: Diabetes Type II


Musculoskeletal History: Arthritis


GI Medical History: No Pertinent History


 History: No Pertinent History


Psycho-Social History: Anxiety, Depression


Female Reproductive Disorders: No Pertinent History


Other Medical History: HTN.  Migraine Headaches.  Coronary Stent.  Brain 

Aneurysm





- Past Surgical History


Past Surgical History: Yes


Neuro Surgical History: No Pertinent History


Cardiac: Cardiac Catheterization, Cardiac Stent


Respiratory: No Pertinent History


Gastrointestinal: No Pertinent History


Genitourinary: No Pertinent History


Musculoskeletal: No Pertinent History


Female Surgical History: Tubal Ligation


Other Surgical History: heart STENT X1, tonsilectomy, BRAIN ANEURYSM


Significant Family History: heart disease





- Social History


Smoking Status: Former smoker


Exposure to second hand smoke: No


Drug Use: none


Patient Lives Alone: No





- Social Determinants of Health


Will the patient participate in the screening: Declined to provide





- Nursing Vital Signs


Nursing Vital Signs: 


                               Initial Vital Signs











Temperature  97.4 F   12/01/24 09:30


 


Pulse Rate  87   12/01/24 09:30


 


Respiratory Rate  16   12/01/24 09:30


 


Blood Pressure  142/95   12/01/24 09:30


 


O2 Sat by Pulse Oximetry  98   12/01/24 09:30








                                   Pain Scale











Pain Intensity                 4

















- [place] Coma Scale


Best Eye Response ([place]): (4) open spontaneously


Best Verbal Response ([place]): (5) oriented


Best Motor Response ([place]): (6) obeys commands


[place] Total: 15





- Physical Exam


General Appearance: no apparent distress, alert


Eye Exam: bilateral eye: PERRL, EOMI


Ears, Nose, Throat Exam: normal ENT inspection, moist mucous membranes


Neck Exam: normal inspection, non-tender, supple


Respiratory: normal breath sounds, lungs clear, airway intact, No respiratory 

distress


Cardiovascular: regular rate/rhythm, No edema


Gastrointestinal: soft, No tenderness, No distention


Back Exam: normal inspection


Extremity Exam: normal inspection, No pedal edema


Mental Status: alert, oriented x 3


CNs Exam: normal hearing, normal speech, PERRL, tongue midline


Coordination/Gait: normal finger to nose, normal gait


Motor/Sensory: no motor deficit, no sensory deficit, no pronator drift, negative

Babinski's sign


Skin Exam: normal color, warm, dry, No rash


**SpO2 Interpretation**: normal


SpO2: 98


O2 Delivery: Room Air





- Course


Nursing assessment & vital signs reviewed: Yes


EKG Interpreted by Me: Sinus Rhythm





- CT Exams


  ** Head


CT Interpretation: Tele-radiologist Report


Ordered Tests: 


                               Active Orders 24 hr











 Category Date Time Status


 


 IV Insertion STAT Care  12/01/24 10:09 Active


 


 NPO (ED) STAT Care  12/01/24 10:10 Active


 


 POCT Glucose Check ONCE Care  12/01/24 10:09 Active


 


 HEAD WITHOUT CONTRAST [CT] Stat Exams  12/01/24 09:35 Completed


 


 CBC W DIFF Stat Lab  12/01/24 10:00 Completed


 


 CMP Stat Lab  12/01/24 10:00 Completed


 


 CULTURE,URINE Stat Lab  12/01/24 10:31 Received


 


 POCT GLUCOSE Stat Lab  12/01/24 09:28 Completed


 


 UA W/RFX UR CULTURE Stat Lab  12/01/24 10:31 Completed








Medication Summary











Generic Name Dose Route Start Last Admin





  Trade Name Freq  PRN Reason Stop Dose Admin


 


Ondansetron HCl  4 mg  12/01/24 09:52  12/01/24 09:55





  Ondansetron Hcl 4 Mg/2 Ml Vial  IV  12/01/24 09:53  4 mg





  STAT ONE   Administration











Lab/Rad Data: 


                           Laboratory Result Diagrams





                                 12/01/24 10:00 





                                 12/01/24 10:00 





                               Laboratory Results











  12/01/24 12/01/24 12/01/24 Range/Units





  10:31 10:00 10:00 


 


WBC    7.5  (3.98-10.04)  x10^3/uL


 


RBC    4.12  (3.93-5.22)  x10^6/uL


 


Hgb    12.2  (11.2-15.7)  g/dL


 


Hct    37.3  (34.1-44.9)  %


 


MCV    90.5  (79.4-94.8)  fL


 


MCH    29.6  (25.6-32.2)  pg


 


MCHC    32.7  (32.2-35.5)  g/dL


 


RDW    12.6  (11.7-14.4)  %


 


Plt Count    333  (182-369)  x10^3/uL


 


MPV    9.9  (9.4-12.3)  fL


 


Gran %    52.3  (34.0-71.1)  %


 


Immature Gran % (Auto)    0.4  (0.001-0.429)  %


 


Nucleat RBC Rel Count    0.0  (0.00-0.2)  %


 


Eos # (Auto)    0.36  (0.04-0.36)  x10^3/uL


 


Immature Gran # (Auto)    0.03  (0.001-0.031)  x10^3u/L


 


Absolute Lymphs (auto)    2.28  (1.18-3.74)  x10^3/uL


 


Absolute Monos (auto)    0.85  (0.24-0.86)  x10^3/uL


 


Absolute Nucleated RBC    0.00  (0.00-0.012)  x10^3u/L


 


Lymphocytes %    30.4  (19.3-51.7)  %


 


Monocytes %    11.3  (4.7-12.5)  %


 


Eosinophils %    4.8  (0.7-5.8)  %


 


Basophils %    0.8  (0.1-1.2)  %


 


Absolute Granulocytes    3.93  (1.56-6.13)  x10^3/uL


 


Basophils #    0.06  (0.01-0.08)  x10^3/uL


 


Sodium   139   (135-145)  mmol/L


 


Potassium   3.3 L   (3.5-5.1)  mmol/L


 


Chloride   108 H   ()  mmol/L


 


Carbon Dioxide   21 L   (22-30)  mmol/L


 


Anion Gap   12.8   (5-15)  MEQ/L


 


BUN   12   (7-17)  mg/dL


 


Creatinine   0.72   (0.52-1.04)  mg/dL


 


Estimated GFR   95.1   ML/MIN


 


Glucose   86   ()  mg/dL


 


POC Glucometer     (74 to 106)  mg/dL


 


Calcium   9.0   (8.4-10.2)  mg/dL


 


Total Bilirubin   0.60   (0.2-1.3)  mg/dL


 


AST   95 H   (14-36)  U/L


 


ALT   89 H   (0-35)  U/L


 


Alkaline Phosphatase   148 H   ()  U/L


 


Serum Total Protein   7.5   (6.3-8.2)  g/dL


 


Albumin   4.0   (3.5-5.0)  g/dL


 


Urine Color  Dark Yellow A    (Yellow)  


 


Urine Appearance  Cloudy A    (Clear)  


 


Urine pH  6.0    (4.6-8.0)  


 


Ur Specific Gravity  1.025    (1.005-1.030)  


 


Urine Protein  Trace A    (Negative)  


 


Urine Glucose (UA)  Negative    (Negative)  mg/dL


 


Urine Ketones  Trace A    (Negative)  


 


Urine Blood  Trace    (Negative)  


 


Urine Nitrite  Negative    (Negative)  


 


Urine Bilirubin  Small A    (Negative)  


 


Urine Urobilinogen  1.0 A    (0.2)  mg/dL


 


Ur Leukocyte Esterase  Small A    (Negative)  


 


U Hyaline Cast (Auto)  NONE SEEN    (0-2)  /LPF


 


Urine Microscopic RBC  3-5    (0-5)  /HPF


 


Urine Microscopic WBC  11-20 A    (0-5)  /HPF


 


Ur Epithelial Cells  Moderate A    (None Seen)  /HPF


 


Calcium Oxalate Crystal  6-10 A    (None Seen)  /HPF


 


Urine Bacteria  Few A    (None Seen)  /HPF


 


Urine Culture Reflexed  YES    (NO)  














  12/01/24 Range/Units





  09:28 


 


WBC   (3.98-10.04)  x10^3/uL


 


RBC   (3.93-5.22)  x10^6/uL


 


Hgb   (11.2-15.7)  g/dL


 


Hct   (34.1-44.9)  %


 


MCV   (79.4-94.8)  fL


 


MCH   (25.6-32.2)  pg


 


MCHC   (32.2-35.5)  g/dL


 


RDW   (11.7-14.4)  %


 


Plt Count   (182-369)  x10^3/uL


 


MPV   (9.4-12.3)  fL


 


Gran %   (34.0-71.1)  %


 


Immature Gran % (Auto)   (0.001-0.429)  %


 


Nucleat RBC Rel Count   (0.00-0.2)  %


 


Eos # (Auto)   (0.04-0.36)  x10^3/uL


 


Immature Gran # (Auto)   (0.001-0.031)  x10^3u/L


 


Absolute Lymphs (auto)   (1.18-3.74)  x10^3/uL


 


Absolute Monos (auto)   (0.24-0.86)  x10^3/uL


 


Absolute Nucleated RBC   (0.00-0.012)  x10^3u/L


 


Lymphocytes %   (19.3-51.7)  %


 


Monocytes %   (4.7-12.5)  %


 


Eosinophils %   (0.7-5.8)  %


 


Basophils %   (0.1-1.2)  %


 


Absolute Granulocytes   (1.56-6.13)  x10^3/uL


 


Basophils #   (0.01-0.08)  x10^3/uL


 


Sodium   (135-145)  mmol/L


 


Potassium   (3.5-5.1)  mmol/L


 


Chloride   ()  mmol/L


 


Carbon Dioxide   (22-30)  mmol/L


 


Anion Gap   (5-15)  MEQ/L


 


BUN   (7-17)  mg/dL


 


Creatinine   (0.52-1.04)  mg/dL


 


Estimated GFR   ML/MIN


 


Glucose   ()  mg/dL


 


POC Glucometer  128 H  (74 to 106)  mg/dL


 


Calcium   (8.4-10.2)  mg/dL


 


Total Bilirubin   (0.2-1.3)  mg/dL


 


AST   (14-36)  U/L


 


ALT   (0-35)  U/L


 


Alkaline Phosphatase   ()  U/L


 


Serum Total Protein   (6.3-8.2)  g/dL


 


Albumin   (3.5-5.0)  g/dL


 


Urine Color   (Yellow)  


 


Urine Appearance   (Clear)  


 


Urine pH   (4.6-8.0)  


 


Ur Specific Gravity   (1.005-1.030)  


 


Urine Protein   (Negative)  


 


Urine Glucose (UA)   (Negative)  mg/dL


 


Urine Ketones   (Negative)  


 


Urine Blood   (Negative)  


 


Urine Nitrite   (Negative)  


 


Urine Bilirubin   (Negative)  


 


Urine Urobilinogen   (0.2)  mg/dL


 


Ur Leukocyte Esterase   (Negative)  


 


U Hyaline Cast (Auto)   (0-2)  /LPF


 


Urine Microscopic RBC   (0-5)  /HPF


 


Urine Microscopic WBC   (0-5)  /HPF


 


Ur Epithelial Cells   (None Seen)  /HPF


 


Calcium Oxalate Crystal   (None Seen)  /HPF


 


Urine Bacteria   (None Seen)  /HPF


 


Urine Culture Reflexed   (NO)  








CLINICAL HISTORY: confusion


COMPARISON: 12/05/2019 CT.


TECHNIQUE: Axial non-contrast CT scan of the brain was performed from the skull 

base to the high


parietal region. One of the following dose reduction techniques were utilized 

for this exam:


Automated exposure control, adjustment of the mA and/or kV according to patient 

size, use of


iterative reconstruction.


FINDINGS:


Brain Parenchyma:


Normal attenuation of the cerebral hemispheres, cerebellum, and brainstem.


No evidence of acute infarct, hemorrhage, or mass effect.


No abnormal areas of hypo- or hyperattenuation.


Ventricular System:


Ventricles are normal in size and configuration.


No evidence of hydrocephalus or ventricular enlargement.


Subarachnoid Spaces:


Normal sulci and cisterns.


No evidence of subarachnoid hemorrhage or extra-axial fluid collections.


Accession: [id number] PatientID: [id number] Patient Name: FARSHAD MINAYA


Exam Date: 12/1/2024 Procedure: HEAD WITHOUT CONTRAST


page 1 of 2


Cerebellum and Brainstem:


Normal size and attenuation.


Orbits:


Normal appearance of the globes, optic nerves, and extraocular muscles.


Sinuses:


Clear paranasal sinuses.


No evidence of sinusitis or mucosal thickening.


Mastoid Air Cells:


Clear mastoid air cells.


No evidence of mastoiditis.


Skull and scalp:


Hyperostosis frontalis interna.


Normal skull morphology.


Vessels:


Bilateral vertebral artery stents in place.


IMPRESSION:


1. No acute intracranial abnormality. MRI with diffusion imaging is advised to 

detect acute ischemic


stroke if clinically warranted.


2. No significant interval changes.


[facility] ER was called at [phone] at 9:01 AM CST,


12/01/2024,and nurse Finch was informed regarding the stroke results.





- Progress


Progress: improved


Counseled pt/family regarding: lab results, diagnosis, need for follow-up, rad 

results





Medical Desision Making





- Independent Historian


Additional History obtained from: Family





- Risk of complications


Minimal Risk: Minimal risk of morbidity





- Departure


Departure Disposition: Home


Clinical Impression: 


 Elevated liver enzymes, Memory changes





UTI (urinary tract infection)


Qualifiers:


 Urinary tract infection type: acute cystitis Hematuria presence: without 

hematuria Qualified Code(s): N30.00 - Acute cystitis without hematuria





Condition: Stable


Critical Care Time: No


Referrals: 


RICKI CASTAÑEDA, NP [Primary Care Provider] - Follow up/PCP as directed


Instructions:  Mild cognitive impairment, Evaluating memory and thinking pro

blems, Amnesia, How to Help Your Memory


Additional Instructions: 


Discharge/Care Plan





FARSHAD MINAYA was seen on 12/01/24 in the Emergency Room. The patient was 

counseled regarding Diagnosis,Lab results, Imaging studies, need for follow up 

and when to return to the Emergency Room.





Prescriptions given:





Discharge Note





I have spoken with the patient and/or caregivers. I have explained the patient's

condition, diagnosis and treatment plan based on the information available to me

at this time. I have answered the patient's and/or caregiver's questions and 

addressed any concerns. The patient and/or caregivers have as good understanding

of the patient's diagnosis, condition and treatment plan as can be expected at 

this point. The vital signs have been stable. The patient's condition is stable 

and appropriate for discharge from the emergency department.





The patient will pursue further outpatient evaluation with the primary care 

physician or other designated or consulting physician as outlined in the 

discharge instructions. The patient and/or caregivers are agreeable to this plan

of care and follow-up instructions have been explained in detail. The patient 

and/or caregivers have received these instruction. The patient/and or caregivers

are aware that any significant change in condition or worsening of symptoms 

should prompt an immediate return to this or the closest emergency department or

call 911. 





MARIMARFARSHAD SANTOS was seen on 12/01/24 n the Emergency Room. At that time you were 

treated for an emergent condition, during your visit Laboratory, Radiology 

and/or other procedures may have been ordered. It is very important that you 

follow-up with your Primary Care Physician RICKI CASTAÑEDA within the next 24-48 

hours to review your Emergency Room visit and the final results of testing that 

was ordered.  Some test results such as Urine Cultures, Blood Cultures, and 

other cultures if ordered will not be finalized for 24-48 hours.





If you do not have a Primary Care Provider please call the medical records 

department at [phone] ext [phone] to obtain a copy of your results or you may 

sign into our patient portal to obtain these results by visiting us @ [email or web address]

[email or web address] and completing the following steps:





1. Click on the Patient Portal link





2. Click the Patient Self Enrollment Link to complete the enrollment form and 

entering your Medical Record Number [id number]





3. Once the enrollment form is completed you will receive an email with a 

temporary ID and password at the email address you provided. 





4. Next choose a user name and password. Your user name must be at least 4 

characters long and your password must be at least 4 characters long.





5. Choose a security question from the list and provide your answer to the 

question.





If you already have signed into the Health Portal you may access your Health 

Care Information  24/7 by the following steps:





1. Login to  our website @ [email or web address]





2. Enter your original user name and password.





FAQS





The [facility] Health Portal is an online tool that contains your Lab Results, 

Radiology Reports, Visit History, Discharge Instructions and Health Summary 





Lab and Radiology Results will not be available for 72 hours on the portal.





The Portal is a secure site, passwords are encryted and URLs are re-written so 

they cannot be copied and pasted. You and authorized family members are the only

ones who can access your Portal. Also there is a timeout feature that protects 

your information if you leave the Portal page open.





If you have technical difficulty please use the Contact Us link on the page this

will allow you to submit any questions you have regarding the Portal or you may 

contact the Medical Record Department at [phone].


Prescriptions: 


Ciprofloxacin [Cipro 500 MG***] 500 mg PO BIDAC #10 tablet